# Patient Record
Sex: FEMALE | Race: WHITE | NOT HISPANIC OR LATINO | Employment: OTHER | URBAN - METROPOLITAN AREA
[De-identification: names, ages, dates, MRNs, and addresses within clinical notes are randomized per-mention and may not be internally consistent; named-entity substitution may affect disease eponyms.]

---

## 2017-03-09 ENCOUNTER — ALLSCRIPTS OFFICE VISIT (OUTPATIENT)
Dept: OTHER | Facility: OTHER | Age: 77
End: 2017-03-09

## 2017-07-13 ENCOUNTER — ALLSCRIPTS OFFICE VISIT (OUTPATIENT)
Dept: OTHER | Facility: OTHER | Age: 77
End: 2017-07-13

## 2017-07-13 DIAGNOSIS — G20 PARKINSON'S DISEASE (HCC): ICD-10-CM

## 2018-01-13 VITALS
BODY MASS INDEX: 26.68 KG/M2 | DIASTOLIC BLOOD PRESSURE: 62 MMHG | SYSTOLIC BLOOD PRESSURE: 108 MMHG | WEIGHT: 145 LBS | HEIGHT: 62 IN | OXYGEN SATURATION: 95 % | RESPIRATION RATE: 12 BRPM | HEART RATE: 83 BPM

## 2018-01-18 NOTE — PROGRESS NOTES
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)    Plan  Parkinson's disease, S/P deep brain stimulator placement    · Follow-up visit in 4 Months Evaluation and Treatment  Follow-up 4-5 months DBS 1   Status: Hold For - Scheduling  Requested for: [id number]   Ordered; For: Parkinson's disease, S/P deep brain stimulator placement; Ordered By: Yari Pedraza Performed:  Due: [id number]    Discussion/Summary  Discussion Summary:   Parkinsonian symptoms are fairly well controlled and stable on Sinemet q 3 5 hours 5 times daily  Wearing off sometimes due to delayed dosing  No changes will be made to medications or stimulation  Will check stimulator battery on follow up  Mild transient right hand paresthesia  likely positional due to leaning on elbow as this resolves in seconds  Patient's Capacity to Self-Care: Patient is unable to Self-Care:   Counseling Documentation With Imm: The patient, patient's family was counseled regarding impressions  Chief Complaint  Chief Complaint Free Text Note Form: Patient presents for a follow-up for DBS  History of Present Illness  HPI: Ms Jaida Ngo is a 68year old female with Parkinson's disease for over 10 years s/p bilateral STN DBS in May 2012 at [place], last IPG replacement 2/4/16, who presents for follow up  To review, symptoms began around 2005 with leg shaking  Initially diagnosed with RLS  A few months later, she was diagnosed with PD  When initially seen she was on Sinemet 25/100 2 tabs qid (6am then q 4 hours while awake, 4 times daily) and Comtan every other day  Comtan later stopped as was not covered  She remains on Sinemet 1 5tabs 5 times a day q 3-3/5 hours  She has some return of tremor when she is wearing off about 30 minutes prior to the next dose   They are sometimes late with the dose so she goes looking of the aides  She occasionally has right hand numbness  This only last a few seconds  She is walked at [facility] almost daily  She scoots herself around in her chair  She no longer has dyskinesia  She is able to dress on her own, but she is assisted showering  She toilets on her own using bars  She is eating well and there is no dysphagia             Review of Systems  Neurological ROS:   Constitutional: recent weight gain  HEENT: feeling congested and hoarseness  Cardiovascular:  no chest pain or pressure, no palpitations present, the heart rate was not rapid or irregular, no swelling in the arms or legs, no poor circulation  Respiratory:  no unusual or persistant cough, no shortness of breath with or without exertion  Gastrointestinal:  no nausea, no vomiting, no diarrhea, no abdominal pain, no changes in bowel habits, no melena, no loss of bowel control  Genitourinary:  no incontinence, no feelings of urinary urgency, no increase in frequency, no urinary hesitancy, no dysuria, no hematuria  Musculoskeletal: immobility or loss of function  Integumentary  no masses, no rash, no skin lesions, no livedo reticularis  Psychiatric: anxiety and depression  Endocrine  no unusual weight loss or gain, no excessive urination, no excessive thirst, no hair loss or gain, no hot or cold intolerance, no menstrual period change or irregularity, no loss of sexual ability or drive, no erection difficulty, no nipple discharge  Hematologic/Lymphatic:  no unusual bleeding, no tendency for easy bruising, no clotting skin or lumps  Neurological General:  no headache, no nausea or vomiting, no lightheadedness, no convulsions, no blackouts, no syncope, no trauma, no photopsia, no increased sleepiness, no trouble falling asleep, no snoring, no awakening at night  Neurological Mental Status: memory problems     Neurological Cranial Nerves:  no blurry or double vision, no loss of vision, no face drooping, no facial numbness or weakness, no taste or smell loss/changes, no hearing loss or ringing, no vertigo or dizziness, no dysphagia, no slurred speech  Neurological Motor findings include:  no tremor, no twitching, no cramping(pre/post exercise), no atrophy  Neurological Coordination: balance difficulties  Neurological Sensory:  no numbness, no pain, no tingling, does not fall when eyes closed or taking a shower  Neurological Gait: difficulty walking  ROS Reviewed:   ROS reviewed  Active Problems    1  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,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) (F06 8,G20)   2  Constipation (564 00) (K59 00)   3  Depression with anxiety (300 4) (F41 8)   4  Encounter for postoperative care (V58 49) (Z48 89)   5  Hip pain (719 45) (M25 559)   6  Hypertension (401 9) (I10)   7  Malignant neoplasm of breast, unspecified laterality   8  Memory Lapses Or Loss (780 93)   9  Muscle Cramps (729 82)   10  Parkinson's disease (332 0) (G20)   11  S/P deep brain stimulator placement (V45 89) (Z96 89)   12  Sciatica, right   13  Shoulder pain, right (719 41) (M25 511)   14  Thyroid disorder (246 9) (E07 9)   15  Uterine cancer (179) (C55)    Past Medical History    1  Malignant neoplasm of breast, unspecified laterality  Active Problems And Past Medical History Reviewed: The active problems and past medical history were reviewed and updated today  Surgical History    1  History of Breast Surgery Lumpectomy   2  History of Cataract Surgery   3  History of Cholecystectomy   4  History of Hernia Repair   5  History of Implantation Of Cortical Neurostimulator   6  History of Placement Of Intracranial Neurostimulator Pulse Generator   7  History of Thyroid Surgery   8  History of Total Abdominal Hysterectomy  Surgical History Reviewed: The surgical history was reviewed and updated today  Family History  Mother    1  Family history of Diabetes Mellitus (V18 0)   2   Family history of Family Health Status Of Mother -    3  Family history of Stroke Syndrome (V17 1)  Father    4  Family history of Cancer   5  Family history of Family Health Status Of Father -   Sister    10  Family history of Family Health Status Sister 1   7  Family history of Stroke Syndrome (V17 1)  Brother    8  Family history of Cancer   9  Family history of Family Health Status Brother 1    Social History    · Alcohol Use (History)   · Caffeine Use   · Educational Level - Grade 12 Completed   · Living Independently Alone (V60 3)   · Marital History -    · Never A Smoker   · Occupation: Retired  Social History Reviewed: The social history was reviewed and updated today  Current Meds   1  Acetaminophen 325 MG Oral Tablet; TAKE 1 TO 2 TABLETS EVERY 4 HOURS AS   NEEDED; Therapy: (Recorded:2016) to Recorded   2  Artificial Tears 0 4 % SOLN; INSTILL 1-2 DROPS INTO THE AFFECTED EYE(S) EVERY 6   HOURS; Therapy: (Recorded:2016) to Recorded   3  Aspirin 81 MG TABS; TAKE 1 TABLET DAILY; Therapy: (Recorded:2016) to Recorded   4  Atenolol 25 MG Oral Tablet; take 0 5 tablet daily; Therapy: (Recorded:2016) to Recorded   5  Biofreeze GEL; Apply twice a day to right shoulder and arm; Therapy: (Recorded:2016) to Recorded   6  Carbidopa-Levodopa  MG Oral Tablet; TAKE 1 AND 1/2 TABLETS 5 TIMES DAILY   (EVERY 3 HRS); Therapy: (Recorded:2017) to Recorded   7  Cetirizine HCl - 1 MG/ML Oral Syrup; 5ml once daily; Therapy: (Recorded:2016) to Recorded   8  ClonazePAM 1 MG Oral Tablet; TAKE 0 5 TABLET 3 times daily and 0 5 bid prn; Last   Rx:[id number] Ordered   9  Clotrimazole 1 % External Cream; cleanse under left breast bid prn; Therapy: (Recorded:2016) to Recorded   10  Colace 100 MG Oral Capsule; TAKE 2 CAPSULE Daily; Therapy: (Recorded:2016) to Recorded   11  Guaifenesin-Codeine 100-10 MG/5ML LIQD; PRN; Therapy: (Recorded:2016) to Recorded   12   Levothyroxine Sodium 50 MCG Oral Tablet; TAKE 1 TABLET DAILY AS DIRECTED; Therapy: (Recorded:03Nov2016) to Recorded   13  Senna Plus TABS; TAKE 1 TABLET DAILY AS NEEDED; Therapy: (Recorded:03Nov2016) to Recorded   14  Sertraline HCl - 100 MG Oral Tablet; Take 1 tablet daily; Therapy: (Recorded:03Nov2016) to Recorded   15  Vitamin D3 1000 UNIT Oral Tablet; TAKE 1 TABLET DAILY; Therapy: (Recorded:03Nov2016) to Recorded  Medication List Reviewed: The medication list was reviewed and updated today  Allergies    1  Codeine Derivatives   2  Latex Gloves MISC   3  Morphine Derivatives   4  Sulfa Drugs    Vitals  Signs   Recorded: 20XKN1492 10:36AM   Heart Rate: 72  Respiration: 12  Systolic: 610  Diastolic: 68  Height: 5 ft 2 in  Weight: 145 lb   BMI Calculated: 26 52  BSA Calculated: 1 67    Physical Exam    Constitutional   General appearance: No acute distress, well appearing and well nourished  Musculoskeletal   Gait and station: Abnormal   (Not ambulated given significant postural stability  Walks with gait belt with PT  )   Muscle strength: Normal strength throughout  Muscle tone: No atrophy, abnormal movements, flaccidity, cogwheeling or spasticity  Involuntary movements: Abnormal involuntary movements were observed  (No action tremor  No rest tremor  Mild bradykinesia on finger taps 2,1, handgrips 2,1, TEMI 2,1 and heel taps 1,1 toe taps 2,2  Mild neck dyskinesia with activation  No dystonia)   Neurologic   Orientation to person, place, and time: Normal     Recent and remote memory: Demonstrates normal memory  Attention span and concentration: Normal thought process and attention span  Language: Names objects, able to repeat phrases and speaks spontaneously      2nd cranial nerve: Normal     3rd, 4th, and 6th cranial nerves: Normal     5th cranial nerve: Normal     7th cranial nerve: Normal     8th cranial nerve: Normal     9th cranial nerve: Normal     11th cranial nerve: Normal     12th cranial nerve: Normal        Signatures   Electronically signed by : Gian Cosby MD; Mar  9 2017 10:54AM EST                       (Author)

## 2018-01-22 VITALS
RESPIRATION RATE: 12 BRPM | HEART RATE: 72 BPM | WEIGHT: 145 LBS | SYSTOLIC BLOOD PRESSURE: 110 MMHG | DIASTOLIC BLOOD PRESSURE: 68 MMHG | BODY MASS INDEX: 26.68 KG/M2 | HEIGHT: 62 IN

## 2018-02-15 ENCOUNTER — PROCEDURE VISIT (OUTPATIENT)
Dept: NEUROLOGY | Facility: CLINIC | Age: 78
End: 2018-02-15
Payer: COMMERCIAL

## 2018-02-15 VITALS
HEART RATE: 82 BPM | WEIGHT: 145 LBS | SYSTOLIC BLOOD PRESSURE: 111 MMHG | DIASTOLIC BLOOD PRESSURE: 55 MMHG | HEIGHT: 62 IN | BODY MASS INDEX: 26.68 KG/M2

## 2018-02-15 DIAGNOSIS — Z96.89 S/P DEEP BRAIN STIMULATOR PLACEMENT: ICD-10-CM

## 2018-02-15 DIAGNOSIS — G20 PARKINSON'S DISEASE (HCC): Primary | ICD-10-CM

## 2018-02-15 PROBLEM — R48.2: Status: ACTIVE | Noted: 2018-02-15

## 2018-02-15 PROCEDURE — 95970 ALYS NPGT W/O PRGRMG: CPT | Performed by: PSYCHIATRY & NEUROLOGY

## 2018-02-15 PROCEDURE — 3725F SCREEN DEPRESSION PERFORMED: CPT | Performed by: PSYCHIATRY & NEUROLOGY

## 2018-02-15 PROCEDURE — 99214 OFFICE O/P EST MOD 30 MIN: CPT | Performed by: PSYCHIATRY & NEUROLOGY

## 2018-02-15 RX ORDER — ACETAMINOPHEN 160 MG/5ML
1-2 SOLUTION ORAL EVERY 4 HOURS PRN
COMMUNITY
End: 2019-03-11

## 2018-02-15 RX ORDER — CLONAZEPAM 0.5 MG/1
TABLET ORAL 3 TIMES DAILY
COMMUNITY
Start: 2018-02-06

## 2018-02-15 RX ORDER — ASPIRIN 81 MG/1
81 TABLET, CHEWABLE ORAL
Status: ON HOLD | COMMUNITY
Start: 2013-04-09 | End: 2019-03-15 | Stop reason: ALTCHOICE

## 2018-02-15 NOTE — ASSESSMENT & PLAN NOTE
Overall parkinsonian symptoms are stable  She has prominent postural instability but functioning well just using wheelchair to propel and with self transfers  Will continue on Sinemet with no changes  Deep brain stimulator interrogated but no changes made  Battery 2 83  See scanned stimulation sheets for details

## 2018-02-15 NOTE — ASSESSMENT & PLAN NOTE
Intermittent and appears to be worse today but daughter reports overall she felt it was less frequent  This is likely related to progression of Pd  Once again discussed option of neurotoxin injections to obicularis oculi but she feels it is not bothersome enough to do

## 2018-02-15 NOTE — PROGRESS NOTES
Patient ID: Zehra Grossman is a 68 y o  female  Assessment/Plan:    Parkinson's disease ([facility] [address] )  Overall parkinsonian symptoms are stable  She has prominent postural instability but functioning well just using wheelchair to propel and with self transfers  Will continue on Sinemet with no changes  Deep brain stimulator interrogated but no changes made  Battery 2 83  See scanned stimulation sheets for details  Apraxia of eyelid  Intermittent and appears to be worse today but daughter reports overall she felt it was less frequent  This is likely related to progression of Pd  Once again discussed option of neurotoxin injections to obicularis oculi but she feels it is not bothersome enough to do  Diagnoses and all orders for this visit:    Parkinson's disease ([facility] [address] )    S/P deep brain stimulator placement    Other orders  -     Acetaminophen 325 MG/10 15ML SOLN; Take 1-2 tablets by mouth every 4 (four) hours as needed  -     hypromellose (NATURES TEARS) 0 4 % SOLN; Apply to eye  -     aspirin 81 mg chewable tablet; Chew 81 mg  -     Cetirizine HCl 5 MG/5ML SYRP; Take 5 mg by mouth  -     Cholecalciferol 1000 units CHEW; Chew 1,000 Units  -     clonazePAM (KlonoPIN) 0 5 mg tablet;            Subjective:    Ms Jerry Peter is a pleasant NH resident with Parkinson's disease for over 10 years s/p bilateral STN DBS in May 2012 at [facility], last IPG replacement 2/4/16, who presents for follow up  To review, symptoms began around 2005 with leg shaking  Initially diagnosed with RLS  A few months later, she was diagnosed with PD  When initially seen she was on Sinemet 25/100 2 tabs qid (6am then q 4 hours while awake, 4 times daily) and Comtan every other day  Comtan later stopped as was not covered  She remains on Sinemet 1 5tabs 5 times a day q 3-3/5 hours    She is assisted in the NH with showering  She dresses independently but mild slowness with shoes  She denies any dysphagia but meat is ground   No issues with liquids  She does however take her medications in apple sauce at times  She sleeps well  She is on gabapentin for back pain  She walks with walker with two assists and spends most of the day scooting herself in the wheelchair  She has two falls during transfer but injuries  She can transfer to toilet and bed on her own  She no longer has much dyskinesia  Mostly present in her face with eye closing  This seems a little better to her daughter but worse today  The following portions of the patient's history were reviewed and updated as appropriate: allergies, past family history, past social history and past surgical history  Objective:    Blood pressure 111/55, pulse 82, height 5' 2" (1 575 m), weight 65 8 kg (145 lb)  Physical Exam   Eyes: EOM are normal  Pupils are equal, round, and reactive to light  Neurological: She has normal strength  Gait normal    Psychiatric: Her speech is normal        Neurological Exam    Mental Status  The patient is alert and oriented to person, place, time, and situation  She has no visuospatial neglect  Her speech is normal  Her language is fluent with no aphasia  She has normal attention span and concentration  She has a normal fund of knowledge  Cranial Nerves  CN I: The patient has not tested  CN II: The patient's visual acuity and visual fields are normal   CN III, IV, VI: The patient's pupils are equally round and reactive to light and ocular movements are normal   CN V: The patient has normal facial sensation  CN VII:  The patient has symmetric facial movement  CN VIII:  The patient's hearing is normal   CN IX, X: The patient has symmetric palate movement and normal gag reflex  CN XI: The patient's shoulder shrug strength is normal   CN XII: The patient's tongue is midline without atrophy or fasciculations  Motor   Her overall muscle tone is normal throughout  Her strength is 5/5 throughout all four extremities  No action tremor  No rest tremor  No rigidity  Moderate bradykinesia on finger taps 2,0, handgrips 2,0, TEMI 2,1 and heel taps 2,2  Eye opening apraxia  No dystonia  Sensory  The patient's sensation is to light touch  Reflexes  She has glabellar tap release signs present  Gait and Coordination  The patient has normal gait and station  ROS:    Review of Systems   Constitutional: Positive for fatigue  HENT: Positive for congestion  Eyes: Negative  Respiratory: Negative  Cardiovascular: Negative  Gastrointestinal: Positive for constipation  Endocrine: Negative  Genitourinary: Negative  Musculoskeletal: Negative  Skin: Negative  Allergic/Immunologic: Negative  Neurological: Positive for tremors (Little bit)  Hematological: Negative  Psychiatric/Behavioral: Negative

## 2018-08-09 ENCOUNTER — PROCEDURE VISIT (OUTPATIENT)
Dept: NEUROLOGY | Facility: CLINIC | Age: 78
End: 2018-08-09
Payer: COMMERCIAL

## 2018-08-09 VITALS — DIASTOLIC BLOOD PRESSURE: 64 MMHG | HEART RATE: 84 BPM | SYSTOLIC BLOOD PRESSURE: 134 MMHG

## 2018-08-09 DIAGNOSIS — R48.2 APRAXIA OF EYELID: Primary | ICD-10-CM

## 2018-08-09 DIAGNOSIS — G20 PARKINSON'S DISEASE (HCC): ICD-10-CM

## 2018-08-09 PROCEDURE — 95970 ALYS NPGT W/O PRGRMG: CPT | Performed by: PSYCHIATRY & NEUROLOGY

## 2018-08-09 PROCEDURE — 99214 OFFICE O/P EST MOD 30 MIN: CPT | Performed by: PSYCHIATRY & NEUROLOGY

## 2018-08-09 RX ORDER — ACETAMINOPHEN 500 MG
350 TABLET ORAL EVERY 4 HOURS PRN
COMMUNITY

## 2018-08-09 RX ORDER — LEVOTHYROXINE SODIUM 0.05 MG/1
1 TABLET ORAL DAILY
COMMUNITY

## 2018-08-09 RX ORDER — ONDANSETRON 4 MG/1
4 TABLET, FILM COATED ORAL EVERY 8 HOURS
COMMUNITY
End: 2019-03-12

## 2018-08-09 RX ORDER — CONTAINER,EMPTY
BOTTLE MISCELLANEOUS AS NEEDED
COMMUNITY
End: 2019-03-12

## 2018-08-09 RX ORDER — OMEPRAZOLE 20 MG/1
20 CAPSULE, DELAYED RELEASE ORAL
COMMUNITY

## 2018-08-09 RX ORDER — FLUTICASONE PROPIONATE 50 MCG
1 SPRAY, SUSPENSION (ML) NASAL DAILY
COMMUNITY
Start: 2018-08-01

## 2018-08-09 RX ORDER — GABAPENTIN 300 MG/1
CAPSULE ORAL DAILY
COMMUNITY
Start: 2018-08-06

## 2018-08-09 NOTE — ASSESSMENT & PLAN NOTE
Parkinsonian symptoms are stable with generalized bradykinesia and postural instability  She is doing well at the NH and functions quite independently given difficulties  Deep brain stimulator interrogated but no changes made  Battery 2 8  See attached forms for settings

## 2018-08-09 NOTE — PROGRESS NOTES
Patient ID: Suraj Rivas is a 66 y o  female  Assessment/Plan:    Parkinson's disease ([facility] [address] )  Parkinsonian symptoms are stable with generalized bradykinesia and postural instability  She is doing well at the NH and functions quite independently given difficulties  Deep brain stimulator interrogated but no changes made  Battery 2 8  See attached forms for settings  Diagnoses and all orders for this visit:    Apraxia of eyelid    Parkinson's disease ([facility] [address] )    Other orders  -     ondansetron (ZOFRAN) 4 mg tablet; Take 4 mg by mouth every 8 (eight) hours  -     omeprazole (PRILOSEC) 20 mg delayed release capsule; Take 20 mg by mouth  -     levothyroxine 50 mcg tablet; Take 1 tablet by mouth daily  -     gabapentin (NEURONTIN) 300 mg capsule;   -     fluticasone (FLONASE) 50 mcg/act nasal spray;   -     acetaminophen (TYLENOL) 500 mg tablet; Take 500 mg by mouth every 4 (four) hours as needed for mild pain  -     benzocaine (ORAJEL) 10 % mucosal gel; Apply to the mouth or throat as needed for mucositis  -     bisacodyl (DULCOLAX) 5 mg EC tablet; Take 10 mg by mouth daily as needed for constipation  -     [employer]s (ENEMA BOTTLE) MISC; by Does not apply route as needed  -     dimethicone (REMEDY NUTRASHIELD) 1 % cream; Apply topically daily as needed for dry skin           Subjective:    Ms Sergey Perry is a pleasant NH resident with Parkinson's disease for over 10 years s/p bilateral STN DBS in May 2012 at [facility], last IPG replacement 2/4/16, who presents for follow up  To review, symptoms began around 2005 with leg shaking  Initially diagnosed with RLS  A few months later, she was diagnosed with PD  When initially seen she was on Sinemet 25/100 2 tabs qid (6am then q 4 hours while awake, 4 times daily) and Comtan every other day  Comtan later stopped as was not covered  She remains on Sinemet 1 5tabs 5 times a day q 3-3 5 hours  She is assisted in the NH with showering  Speech is soft   She underwent speech therapy and has the exercises but does not always do them  Her daughter notices her eyes are squinty at times  She dresses independently but mild slowness with shoes  She sometimes asks for help  She denies any dysphagia but meat is ground  No issues with liquids  She sleeps well  She is on gabapentin for back pain  She walks with walker with two assists and spends most of the day scooting herself in the wheelchair  She toilets independently  She can transfer to toilet and bed on her own  She no longer has dyskinesia  The following portions of the patient's history were reviewed and updated as appropriate: allergies, current medications and problem list            Objective:    Blood pressure 134/64, pulse 84  Physical Exam   Constitutional: She appears well-developed  Eyes: EOM are normal  Pupils are equal, round, and reactive to light  Neurological: She has normal strength and normal reflexes  Gait normal    Psychiatric: Her speech is normal    Vitals reviewed  Neurological Exam    Mental Status  The patient is alert and oriented to person, place, time, and situation  Her recent and remote memory are normal  Her speech is normal  Her language is fluent with no aphasia  She has normal attention span and concentration  She follows multi-step commands  She has a normal fund of knowledge  Cranial Nerves  CN I: The patient has not tested  CN II: The patient's visual acuity and visual fields are normal   CN III, IV, VI: The patient's pupils are equally round and reactive to light and ocular movements are normal   CN V: The patient has normal facial sensation  CN VII:  The patient has symmetric facial movement  CN VIII:  The patient's hearing is normal   CN IX, X: The patient has symmetric palate movement  CN XI: The patient's shoulder shrug strength is normal   CN XII: The patient's tongue is midline without atrophy or fasciculations      Motor   Her overall muscle tone is normal throughout  Her strength is 5/5 throughout all four extremities  UPDRS   Moderate Hypomimia 2  Moderate hypophonia 2  No rest tremor RUE, LUE, RLE, LLE  No facial, lip, or chin tremor  No action tremors of RUE, LUE  Rigidity present in RUE, LUE, RLE, LUE and neck  Moderate bradykinesia on finger taps 2,2, handgrips 1,1, TEMI 2,1, heel taps 0,0, (toe taps 1,0)  Posture stooped 2  Not ambulated today, no walker  Mild but abnormal body bradykinesia 2  Sensory  The patient's sensation is to light touch  Reflexes  Deep tendon reflexes are 2+ and symmetric in all four extremities with downgoing toes bilaterally  Gait and Coordination  The patient has normal gait and station  ROS:    Review of Systems   Constitutional: Negative  Negative for appetite change and fever  HENT: Positive for voice change  Negative for hearing loss, tinnitus and trouble swallowing  Eyes: Negative  Negative for photophobia and pain  Respiratory: Negative  Negative for shortness of breath  Cardiovascular: Negative  Negative for palpitations  Gastrointestinal: Negative  Negative for nausea and vomiting  Endocrine: Negative  Negative for cold intolerance and heat intolerance  Genitourinary: Negative  Negative for dysuria, frequency and urgency  Musculoskeletal: Positive for gait problem  Negative for myalgias and neck pain  Skin: Negative  Negative for rash  Neurological: Positive for tremors and speech difficulty  Negative for dizziness, seizures, syncope, facial asymmetry, weakness, light-headedness, numbness and headaches  Memory problem   Hematological: Negative  Does not bruise/bleed easily  Psychiatric/Behavioral: Negative for confusion, hallucinations and sleep disturbance  The patient is nervous/anxious

## 2019-01-21 LAB — HBA1C MFR BLD HPLC: 9 %

## 2019-01-23 ENCOUNTER — TRANSCRIBE ORDERS (OUTPATIENT)
Dept: ADMINISTRATIVE | Facility: HOSPITAL | Age: 79
End: 2019-01-23

## 2019-01-23 ENCOUNTER — TELEPHONE (OUTPATIENT)
Dept: NEUROLOGY | Facility: CLINIC | Age: 79
End: 2019-01-23

## 2019-01-23 DIAGNOSIS — R42 DIZZINESS AND GIDDINESS: Primary | ICD-10-CM

## 2019-01-23 NOTE — TELEPHONE ENCOUNTER
Jerri @[facility] called and states that pt has been c/o dizziness started 1 week ago  "Vital signs are good"  Dizziness comes and goes, navdeep in the morning and at night  Denies dizziness from sitting to standing  No new meds   Dr Sean Glaser is starting pt on meclizine  Dr Sean Glaser is ordering head CT scan  Pt has dbs  Asking if ok to do head Ct scan? Any precaution? Any recommendation?            [phone]

## 2019-01-24 NOTE — TELEPHONE ENCOUNTER
Jerri @[facility] [phone]    Please clarify re DBS w/CT, does it need to be turned off?  They do not want to turn off if not needed  Below note states it should not interfere w/CT but should turn it off    Also, Jerri asking if there is any recommendation re dizziness    Please advise

## 2019-01-24 NOTE — TELEPHONE ENCOUNTER
Dizziness form sitting to standing sound like orthostatic hypotension but could also be benign paroxsymal position vertigo  Meclizine would help BPPV  Did they do orthostatic blood pressures? Increase hydration   I will further evaluate at her next visit 2/14

## 2019-01-26 ENCOUNTER — HOSPITAL ENCOUNTER (OUTPATIENT)
Dept: CT IMAGING | Facility: HOSPITAL | Age: 79
Discharge: HOME | End: 2019-01-26
Payer: COMMERCIAL

## 2019-01-26 DIAGNOSIS — R42 DIZZINESS AND GIDDINESS: ICD-10-CM

## 2019-01-26 PROCEDURE — 70450 CT HEAD/BRAIN W/O DYE: CPT

## 2019-02-14 ENCOUNTER — TELEPHONE (OUTPATIENT)
Dept: NEUROLOGY | Facility: CLINIC | Age: 79
End: 2019-02-14

## 2019-02-14 ENCOUNTER — PROCEDURE VISIT (OUTPATIENT)
Dept: NEUROLOGY | Facility: CLINIC | Age: 79
End: 2019-02-14
Payer: COMMERCIAL

## 2019-02-14 VITALS — SYSTOLIC BLOOD PRESSURE: 120 MMHG | DIASTOLIC BLOOD PRESSURE: 75 MMHG

## 2019-02-14 DIAGNOSIS — G20 PARKINSON'S DISEASE (HCC): Primary | ICD-10-CM

## 2019-02-14 PROCEDURE — 95970 ALYS NPGT W/O PRGRMG: CPT | Performed by: PSYCHIATRY & NEUROLOGY

## 2019-02-14 PROCEDURE — 99213 OFFICE O/P EST LOW 20 MIN: CPT | Performed by: PSYCHIATRY & NEUROLOGY

## 2019-02-14 RX ORDER — MECLIZINE HCL 12.5 MG/1
12.5 TABLET ORAL 3 TIMES DAILY PRN
COMMUNITY

## 2019-02-14 RX ORDER — POLYETHYLENE GLYCOL 3350 17 G/17G
17 POWDER, FOR SOLUTION ORAL DAILY
COMMUNITY

## 2019-02-14 RX ORDER — MINERAL OIL 100 G/100G
1 OIL RECTAL ONCE
COMMUNITY

## 2019-02-14 NOTE — TELEPHONE ENCOUNTER
Pt seen today by Dr Ceci Martinez  Left IPG at 2 59  Please schedule pt for IPG replacement  Once pt scheduled please forward in-basket message back to me  Thank you

## 2019-02-14 NOTE — PROGRESS NOTES
Patient ID: Aquiles Thorpe is a 66 y o  female  Assessment/Plan:    Parkinson's disease with use of electrical brain stimulation (HCC)  Parkinsonian symptoms are stable with generalized bradykinesia and postural instability  Tremors are well controlled  She is doing well mobilizing with walker at the NH  Deep brain stimulator interrogated  IPG is in need of replacement, 2 59  Will refer to neurosurgery  See attached forms for settings  Diagnoses and all orders for this visit:    Parkinson's disease [facility])  -     Ambulatory referral to Neurosurgery; Future    Other orders  -     mineral oil enema; Insert 1 enema into the rectum once  -     polyethylene glycol (MIRALAX) 17 g packet; Take 17 g by mouth daily  -     Menthol, Topical Analgesic, (BIOFREEZE) 4 % GEL; Apply topically  -     insulin aspart (NovoLOG) 100 Units/mL injection pen; Inject 5 Units under the skin Three times daily as needed  -     insulin glargine (LANTUS SOLOSTAR) 100 units/mL injection pen; Inject 5 Units under the skin  -     meclizine (ANTIVERT) 12 5 MG tablet; Take 12 5 mg by mouth Three times daily as needed  -     benzocaine-menthol (CEPACOL) 15-3 6 mg per lozenge; 1 lozenge by Transmucosal route every 2 (two) hours as needed           Subjective:    Ms Clarke Nguyen is a pleasant NH resident with Parkinson's disease for over 10 years s/p bilateral STN DBS in May 2012 at [facility], last IPG replacement 2/4/16, who presents for follow up  To review, symptoms began around 2005 with leg shaking  Initially diagnosed with RLS  A few months later, she was diagnosed with PD  When initially seen she was on Sinemet 25/100 2 tabs qid (6am then q 4 hours while awake, 4 times daily) and Comtan every other day  Comtan later stopped as was not covered  She remains on Sinemet 1 5tabs 5 times a day q 3-3 5 hours  She denies any wearing off  She has a tremor in the right hand on occasion  She is assisted in the NH with showering   She is able to dress independently but slowly  Speech is soft  She does not do exercises  Her eyes are not as droopy  She denies any dysphagia but meat is ground  No issues with liquids  She sleeps well  She is on gabapentin for back pain  She walks with walker with two assists at times but spends most of the day scooting herself in the wheelchair  She toilets independently  She had two slips but no injuries  She no longer has dyskinesia  Dizzines resolved after being diagnosed with diabetes and being put on treatment  Ct head was unremarkable  The following portions of the patient's history were reviewed and updated as appropriate: allergies, current medications, past family history, past medical history, past social history and past surgical history  Objective:    Blood pressure 120/75  Physical Exam   Constitutional: She appears well-developed  Eyes: Pupils are equal, round, and reactive to light  Neurological: She is alert  She has normal strength  Psychiatric: Her speech is normal    Vitals reviewed  Neurological Exam  Mental Status  Awake and alert  Oriented only to person, place and situation  Recent and remote memory are intact  Speech is normal  Language is fluent with no aphasia  Attention and concentration are normal     Cranial Nerves  CN III, IV, VI: Extraocular movements intact bilaterally  Pupils equal round and reactive to light bilaterally  CN V: Facial sensation is normal   CN VII: Full and symmetric facial movement  CN VIII: Hearing is normal   CN IX, X: Palate elevates symmetrically  CN XI: Shoulder shrug strength is normal   CN XII: Tongue midline without atrophy or fasciculations  Motor   Normal muscle tone  Strength is 5/5 throughout all four extremities  Sensory  Light touch is normal in upper and lower extremities  Reflexes  Glabellar tap present      Coordination  Right: Finger-to-nose normal  Rapid alternating movement abnormality:  Left: Finger-to-nose normal  Rapid alternating movement abnormality:    Gait  Not ambulated        MDS UPDRS III                                  Time since last dose:     Speech  2    Facial Expression  1    Rigidity - Neck  0    Rigidity - Upper Extremity (Right)  1    Rigidity - Upper Extremity (Left)   0    Rigidity - Lower Extremity (Right)  1    Rigidity - Lower Extremity (Left)   0    Finger Taps (Right)   2    Finger Taps (Left)   1    Hand Movement (Right)  0    Hand Movement (Left)   0    Pronation/Supination (Right)  2    Pronation/Supination (Left)   1    Toe Tapping (Right) 1    Toe Tapping (Left) 1    Leg Agility (Right)  2    Leg Agility (Left)   2    Arising from Chair       Gait       Freezing of Gait     Postural Stability       Posture 1    Global spontaneity of movement 2    Postural Tremor (Right) 0    Postural Tremor (Left) 0    Kinetic Tremor (Right)  0    Kinetic Tremor (Left)  0    Rest tremor amplitude RUE 0    Rest tremor amplitude LUE 0    Rest tremor amplitude RLE 0    Reset tremor amplitude LLE 0    Lip/Jaw Tremor  0    Consistency of tremor 0    Motor Exam Total:        Mild right sided dyskinesia, with leg and arm extension only when activated  ROS:    Review of Systems   Constitutional: Positive for appetite change  HENT: Positive for voice change  Eyes: Positive for visual disturbance  Eyelid issues     Respiratory: Negative  Cardiovascular: Negative  Gastrointestinal: Negative  Endocrine: Negative  Genitourinary: Negative  Musculoskeletal: Negative  Skin: Negative  Allergic/Immunologic: Negative  Neurological: Positive for dizziness (has increased recently) and tremors (once in a while right arm shakes)  Hematological: Negative  Psychiatric/Behavioral: The patient is nervous/anxious (sometimes)  All other systems reviewed and are negative  Review of system reviewed and agree with above

## 2019-02-14 NOTE — TELEPHONE ENCOUNTER
Enid Tariq will be calling patient as she will be a new patient/consult for us as of Monday  I am holding 3/19/19 for the surgery date in [address]  Will confirm when we get ahold of patient  Thanks!   Tiff

## 2019-02-14 NOTE — ASSESSMENT & PLAN NOTE
Parkinsonian symptoms are stable with generalized bradykinesia and postural instability  Tremors are well controlled  She is doing well mobilizing with walker at the NH  Deep brain stimulator interrogated  IPG is in need of replacement, 2 59  Will refer to neurosurgery  See attached forms for settings

## 2019-02-15 NOTE — TELEPHONE ENCOUNTER
Patient is schedule for consult with Dr Martir Jose on 3/7/19  Date of 3/19/19 for her surgery will stand  Thanks!   Tiff

## 2019-02-18 NOTE — TELEPHONE ENCOUNTER
Called patients daughter LMOM if patients daughter calls back patient is scheduled for 4/18 at 10:30 in the [facility] location with Dr Rosendo Alexander please confirm appointment   Thank You

## 2019-03-04 ENCOUNTER — TELEPHONE (OUTPATIENT)
Dept: NEUROLOGY | Facility: CLINIC | Age: 79
End: 2019-03-04

## 2019-03-07 ENCOUNTER — OFFICE VISIT (OUTPATIENT)
Dept: NEUROSURGERY | Facility: CLINIC | Age: 79
End: 2019-03-07
Payer: COMMERCIAL

## 2019-03-07 VITALS
HEIGHT: 62 IN | TEMPERATURE: 97.7 F | BODY MASS INDEX: 27.23 KG/M2 | DIASTOLIC BLOOD PRESSURE: 67 MMHG | WEIGHT: 148 LBS | RESPIRATION RATE: 16 BRPM | HEART RATE: 78 BPM | SYSTOLIC BLOOD PRESSURE: 112 MMHG

## 2019-03-07 DIAGNOSIS — G20 PARKINSON'S DISEASE (HCC): ICD-10-CM

## 2019-03-07 PROCEDURE — 99214 OFFICE O/P EST MOD 30 MIN: CPT | Performed by: NEUROLOGICAL SURGERY

## 2019-03-07 RX ORDER — CHLORHEXIDINE GLUCONATE 0.12 MG/ML
15 RINSE ORAL ONCE
Status: CANCELLED | OUTPATIENT
Start: 2019-03-07 | End: 2019-03-07

## 2019-03-07 NOTE — PROGRESS NOTES
Assessment/Plan:    No problem-specific Assessment & Plan notes found for this encounter  Patient with bilateral STN DBS medtronic with end of battery life  OR for left DBS generator replacement     Diagnoses and all orders for this visit:    [facility] ([facility] )  -     Ambulatory referral to Neurosurgery  -     Case request operating room: REPLACEMENT IMPLANTABLE PULSE GENERATOR (IPG) DEEP BRAIN STIMULATION (DBS), LEFT; Standing  -     Case request operating room: REPLACEMENT IMPLANTABLE PULSE GENERATOR (IPG) DEEP BRAIN STIMULATION (DBS), LEFT  -     UA w Reflex to Microscopic w Reflex to Culture  -     Comprehensive metabolic panel; Future  -     CBC and differential; Future  -     APTT; Future  -     Protime-INR; Future  -     HEMOGLOBIN A1C W/ EAG ESTIMATION; Future  -     EKG 12 lead; Future    Other orders  -     Diet NPO; Sips with meds; Standing  -     Nursing Communication [address] [place] [address] Interventions Implemented; Standing  -     Nursing Communication Use 2 CHG cloths, have the patient wash his/her body from the neck down or have staff wash entire body (from neck down) if patient is unable; Standing  -     Nursing Communication Swab both nares with Povidone-Iodine solution, EXCLUDE if patient has shellfish/Iodine allergy; Standing  -     chlorhexidine (PERIDEX) 0 12 % oral rinse 15 mL  -     Void on call to OR; Standing  -     Insert peripheral IV; Standing  -     ceFAZolin (ANCEF) 2,000 mg in sodium chloride 0 9 % 50 mL IVPB          Subjective:      Patient ID: Archie Briones is a 66 y o  female  Patient with PD and bilateral STN electrodes  She obtains good therapy from her stimulator and her last replacement was in 2016  She is nearing end of life and would like a replacement  She lives currently in a nursing facility         The following portions of the patient's history were reviewed and updated as appropriate:   She  has a past medical history of Parkinson's disease ([facility] ) and S/P deep brain stimulator placement  She   Patient Active Problem List    Diagnosis Date Noted    Parkinson's disease ([facility] ) 03/07/2019    Parkinson's disease with use of electrical brain stimulation ([facility] ) 02/15/2018    S/P deep brain stimulator placement 02/15/2018    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     She  has a past surgical history that includes Eye surgery; Hysterectomy; Total thyroidectomy; and pr imp stim,cranial,subq,1 array (Left, 2/4/2016)  Her family history is not on file  She  reports that she has never smoked  She has never used smokeless tobacco  She reports that she does not drink alcohol or use drugs  Current Outpatient Medications on File Prior to Visit   Medication Sig    acetaminophen (TYLENOL) 500 mg tablet Take 500 mg by mouth every 4 (four) hours as needed for mild pain    Acetaminophen 325 MG/10 15ML SOLN Take 1-2 tablets by mouth every 4 (four) hours as needed    aspirin 81 mg chewable tablet Chew 81 mg    atenolol (TENORMIN) 25 mg tablet Take 12 5 mg by mouth daily  Only takes 12 5 mg    benzocaine (ORAJEL) 10 % mucosal gel Apply to the mouth or throat as needed for mucositis    benzocaine-menthol (CEPACOL) 15-3 6 mg per lozenge 1 lozenge by Transmucosal route every 2 (two) hours as needed    bisacodyl (DULCOLAX) 5 mg EC tablet Take 10 mg by mouth daily as needed for constipation    carbidopa-levodopa (SINEMET)  mg per tablet Take 1 5 tablets by mouth 5 (five) times a day 2-7-18-3-6pm      Cetirizine HCl 5 MG/5ML SYRP Take 5 mg by mouth    cholecalciferol (VITAMIN D3) 1,000 units tablet Take 2,000 Units by mouth   Cholecalciferol 1000 units CHEW Chew 1,000 Units    clonazePAM (KlonoPIN) 0 5 mg tablet     dimethicone (REMEDY NUTRASHIELD) 1 % cream Apply topically daily as needed for dry skin    diphenhydrAMINE (BENADRYL) 12 5 MG chewable tablet Chew 12 5 mg 3 (three) times a day as needed for allergies      entacapone (COMTAN) 200 mg tablet Take 200 mg by mouth 2 (two) times a day  For breakthrough tremors    fluticasone (FLONASE) 50 mcg/act nasal spray     gabapentin (NEURONTIN) 300 mg capsule     hypromellose (NATURES TEARS) 0 4 % SOLN Apply to eye    insulin aspart (NovoLOG) 100 Units/mL injection pen Inject 5 Units under the skin Three times daily as needed    insulin glargine (LANTUS SOLOSTAR) 100 units/mL injection pen Inject 5 Units under the skin    levothyroxine 50 mcg tablet Take 1 tablet by mouth daily    meclizine (ANTIVERT) 12 5 MG tablet Take 12 5 mg by mouth Three times daily as needed    Menthol, Topical Analgesic, (BIOFREEZE) 4 % GEL Apply topically    mineral oil enema Insert 1 enema into the rectum once    omeprazole (PRILOSEC) 20 mg delayed release capsule Take 20 mg by mouth    ondansetron (ZOFRAN) 4 mg tablet Take 4 mg by mouth every 8 (eight) hours    polyethylene glycol (MIRALAX) 17 g packet Take 17 g by mouth daily    rivastigmine (EXELON) 1 5 mg capsule Take 1 5 mg by mouth 2 (two) times a day   Rubber Goods (ENEMA BOTTLE) MISC by Does not apply route as needed    senna-docusate sodium (SENOKOT S) 8 6-50 mg per tablet Take 1 tablet by mouth daily as needed for constipation   sertraline (ZOLOFT) 100 mg tablet Take 100 mg by mouth daily at bedtime  No current facility-administered medications on file prior to visit       Review of Systems   Constitutional: Negative  Eyes: Negative  Endocrine: Negative  Musculoskeletal: Positive for gait problem  Allergic/Immunologic: Negative  Hematological:        DM         Objective:      /67 (BP Location: Left arm)   Pulse 78   Temp 97 7 °F (36 5 °C) (Tympanic)   Resp 16   Ht 5' 2" (1 575 m)   Wt 67 1 kg (148 lb)   BMI 27 07 kg/m²          Physical Exam   Constitutional: She is oriented to person, place, and time  She appears well-developed  In wheelchair   HENT:   Head: Normocephalic  Eyes: Pupils are equal, round, and reactive to light  Cardiovascular: Normal rate  Pulmonary/Chest: Effort normal    Neurological: She is alert and oriented to person, place, and time  No cranial nerve deficit or sensory deficit

## 2019-03-07 NOTE — H&P (VIEW-ONLY)
Assessment/Plan:    No problem-specific Assessment & Plan notes found for this encounter  Patient with bilateral STN DBS medtronic with end of battery life  OR for left DBS generator replacement     Diagnoses and all orders for this visit:    Parkinson's disease ([facility] )  -     Ambulatory referral to Neurosurgery  -     Case request operating room: REPLACEMENT IMPLANTABLE PULSE GENERATOR (IPG) DEEP BRAIN STIMULATION (DBS), LEFT; Standing  -     Case request operating room: REPLACEMENT IMPLANTABLE PULSE GENERATOR (IPG) DEEP BRAIN STIMULATION (DBS), LEFT  -     UA w Reflex to Microscopic w Reflex to Culture  -     Comprehensive metabolic panel; Future  -     CBC and differential; Future  -     APTT; Future  -     Protime-INR; Future  -     HEMOGLOBIN A1C W/ EAG ESTIMATION; Future  -     EKG 12 lead; Future    Other orders  -     Diet NPO; Sips with meds; Standing  -     Nursing Communication [address] [place] [address] Interventions Implemented; Standing  -     Nursing Communication Use 2 CHG cloths, have the patient wash his/her body from the neck down or have staff wash entire body (from neck down) if patient is unable; Standing  -     Nursing Communication Swab both nares with Povidone-Iodine solution, EXCLUDE if patient has shellfish/Iodine allergy; Standing  -     chlorhexidine (PERIDEX) 0 12 % oral rinse 15 mL  -     Void on call to OR; Standing  -     Insert peripheral IV; Standing  -     ceFAZolin (ANCEF) 2,000 mg in sodium chloride 0 9 % 50 mL IVPB          Subjective:      Patient ID: Chencho Parmar is a 66 y o  female  Patient with PD and bilateral STN electrodes  She obtains good therapy from her stimulator and her last replacement was in 2016  She is nearing end of life and would like a replacement  She lives currently in a nursing facility         The following portions of the patient's history were reviewed and updated as appropriate:   She  has a [facility] and S/P deep brain stimulator placement  She   Patient Active Problem List    Diagnosis Date Noted    Parkinson's disease ([facility] ) 03/07/2019    Parkinson's disease with use of electrical brain stimulation ([facility] ) 02/15/2018    S/P deep brain stimulator placement 02/15/2018    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     She  has a past surgical history that includes Eye surgery; Hysterectomy; Total thyroidectomy; and pr imp stim,cranial,subq,1 array (Left, 2/4/2016)  Her family history is not on file  She  reports that she has never smoked  She has never used smokeless tobacco  She reports that she does not drink alcohol or use drugs  Current Outpatient Medications on File Prior to Visit   Medication Sig    acetaminophen (TYLENOL) 500 mg tablet Take 500 mg by mouth every 4 (four) hours as needed for mild pain    Acetaminophen 325 MG/10 15ML SOLN Take 1-2 tablets by mouth every 4 (four) hours as needed    aspirin 81 mg chewable tablet Chew 81 mg    atenolol (TENORMIN) 25 mg tablet Take 12 5 mg by mouth daily  Only takes 12 5 mg    benzocaine (ORAJEL) 10 % mucosal gel Apply to the mouth or throat as needed for mucositis    benzocaine-menthol (CEPACOL) 15-3 6 mg per lozenge 1 lozenge by Transmucosal route every 2 (two) hours as needed    bisacodyl (DULCOLAX) 5 mg EC tablet Take 10 mg by mouth daily as needed for constipation    carbidopa-levodopa (SINEMET)  mg per tablet Take 1 5 tablets by mouth 5 (five) times a day 5-1-59-3-6pm      Cetirizine HCl 5 MG/5ML SYRP Take 5 mg by mouth    cholecalciferol (VITAMIN D3) 1,000 units tablet Take 2,000 Units by mouth   Cholecalciferol 1000 units CHEW Chew 1,000 Units    clonazePAM (KlonoPIN) 0 5 mg tablet     dimethicone (REMEDY NUTRASHIELD) 1 % cream Apply topically daily as needed for dry skin    diphenhydrAMINE (BENADRYL) 12 5 MG chewable tablet Chew 12 5 mg 3 (three) times a day as needed for allergies      entacapone (COMTAN) 200 mg tablet Take 200 mg by mouth 2 (two) times a day  For breakthrough tremors    fluticasone (FLONASE) 50 mcg/act nasal spray     gabapentin (NEURONTIN) 300 mg capsule     hypromellose (NATURES TEARS) 0 4 % SOLN Apply to eye    insulin aspart (NovoLOG) 100 Units/mL injection pen Inject 5 Units under the skin Three times daily as needed    insulin glargine (LANTUS SOLOSTAR) 100 units/mL injection pen Inject 5 Units under the skin    levothyroxine 50 mcg tablet Take 1 tablet by mouth daily    meclizine (ANTIVERT) 12 5 MG tablet Take 12 5 mg by mouth Three times daily as needed    Menthol, Topical Analgesic, (BIOFREEZE) 4 % GEL Apply topically    mineral oil enema Insert 1 enema into the rectum once    omeprazole (PRILOSEC) 20 mg delayed release capsule Take 20 mg by mouth    ondansetron (ZOFRAN) 4 mg tablet Take 4 mg by mouth every 8 (eight) hours    polyethylene glycol (MIRALAX) 17 g packet Take 17 g by mouth daily    rivastigmine (EXELON) 1 5 mg capsule Take 1 5 mg by mouth 2 (two) times a day   Rubber Goods (ENEMA BOTTLE) MISC by Does not apply route as needed    senna-docusate sodium (SENOKOT S) 8 6-50 mg per tablet Take 1 tablet by mouth daily as needed for constipation   sertraline (ZOLOFT) 100 mg tablet Take 100 mg by mouth daily at bedtime  No current facility-administered medications on file prior to visit       Review of Systems   Constitutional: Negative  Eyes: Negative  Endocrine: Negative  Musculoskeletal: Positive for gait problem  Allergic/Immunologic: Negative  Hematological:        DM         Objective:      /67 (BP Location: Left arm)   Pulse 78   Temp 97 7 °F (36 5 °C) (Tympanic)   Resp 16   Ht 5' 2" (1 575 m)   Wt 67 1 kg (148 lb)   BMI 27 07 kg/m²          Physical Exam   Constitutional: She is oriented to person, place, and time  She appears well-developed  In wheelchair   HENT:   Head: Normocephalic  Eyes: Pupils are equal, round, and reactive to light  Cardiovascular: Normal rate  Pulmonary/Chest: Effort normal    Neurological: She is alert and oriented to person, place, and time  No cranial nerve deficit or sensory deficit

## 2019-03-11 ENCOUNTER — TELEPHONE (OUTPATIENT)
Dept: NEUROSURGERY | Facility: CLINIC | Age: 79
End: 2019-03-11

## 2019-03-11 DIAGNOSIS — E11.9 TYPE 2 DIABETES MELLITUS WITHOUT COMPLICATION, WITHOUT LONG-TERM CURRENT USE OF INSULIN (HCC): Primary | ICD-10-CM

## 2019-03-11 DIAGNOSIS — Z01.818 PREOPERATIVE EXAMINATION: ICD-10-CM

## 2019-03-11 NOTE — TELEPHONE ENCOUNTER
Signed surgical consent after explained by Dr Gramajo:REPLACEMENT Kelton Mendiola (IPG) DEEP BRAIN STIMULATION (DBS), LEFT (Left Chest)--3/15/2019  HO adverse effect to anesthesia:denies   Comorbid Medical Condition (s) System Assessment:  Cardiac: denies need EKG___  Pulmonary:Denies   Endocrine:new DX DM2--Labs 1/21/2019--->  HgA1C 9 1    Glucose  194  EAG  211 --endocrine consult appointment 3/12/ Dr Bonilla Hidden   Misc/Oncology:denies   Skin Integrity Intactness/ Diversion devices (colostomy, urostomy, ileostomy,)--intact  Personal History Venous thromboembolic disease:  Imagining Requirement: CT Brain 1/23/2019 --images in PACS  Pain Management: n/a   Hold Medications requirement pre-&- post-surgical procedure (AC/antiplatelets, ASA, NSAID, Vitamins, dietary supplements, OTC, DMARDs etc   Medications reviewed cross referenced with medication hold list, provided medication hold list  ASA 18 mg daily last dose 3/11  Hold now will hold Tuesday -Wed -Thursday and Friday --day of surgery ---continue hold 14 day after surgery  Overview of surgical process discussed in detail from this appointment thru 6 weeks  Postop: fax preoperative packet   Patient--discussed with NH nurse Katherine  verbalized and understanding and contact information provided if questions arise  DBS: PD w/ DBS     [facility] [address] [facility] or Katherine ---phone  [phone]   Fax [phone]   Refer to Media--- Nursing packet w/records and Labs in Jan completd at [place]

## 2019-03-12 ENCOUNTER — TELEPHONE (OUTPATIENT)
Dept: NEUROSURGERY | Facility: CLINIC | Age: 79
End: 2019-03-12

## 2019-03-12 LAB
HBA1C MFR BLD HPLC: 7.9 %
HBA1C MFR BLD HPLC: 7.9 %

## 2019-03-12 RX ORDER — GLIMEPIRIDE 1 MG/1
1 TABLET ORAL
COMMUNITY

## 2019-03-12 NOTE — TELEPHONE ENCOUNTER
Received call from Devonte Ma said he was able to use a controller from another patient to disable the system to complete EKG  No further action needed

## 2019-03-12 NOTE — TELEPHONE ENCOUNTER
Received call from Robbi Palma reporting issues with EKG in prep for gen replacement on 3/15/2019 -  Getting artifact due to chest wall IPG  Patient unable to find her remote  Yobaniabdias Rueda of [employer] [phone] - he will contact Caitlyn to arrange a visit with patient tomorrow to assist with turning the system off for the duration of the EKG

## 2019-03-13 ENCOUNTER — TELEPHONE (OUTPATIENT)
Dept: ENDOCRINOLOGY | Facility: CLINIC | Age: 79
End: 2019-03-13

## 2019-03-13 ENCOUNTER — OFFICE VISIT (OUTPATIENT)
Dept: ENDOCRINOLOGY | Facility: CLINIC | Age: 79
End: 2019-03-13
Payer: COMMERCIAL

## 2019-03-13 VITALS — SYSTOLIC BLOOD PRESSURE: 120 MMHG | DIASTOLIC BLOOD PRESSURE: 78 MMHG

## 2019-03-13 DIAGNOSIS — E11.9 TYPE 2 DIABETES MELLITUS WITHOUT COMPLICATION, WITHOUT LONG-TERM CURRENT USE OF INSULIN (HCC): Primary | ICD-10-CM

## 2019-03-13 DIAGNOSIS — Z96.89 S/P DEEP BRAIN STIMULATOR PLACEMENT: ICD-10-CM

## 2019-03-13 DIAGNOSIS — G20 PARKINSON'S DISEASE (HCC): ICD-10-CM

## 2019-03-13 DIAGNOSIS — Z01.818 PREOPERATIVE EXAMINATION: ICD-10-CM

## 2019-03-13 PROCEDURE — 99204 OFFICE O/P NEW MOD 45 MIN: CPT | Performed by: INTERNAL MEDICINE

## 2019-03-13 NOTE — PATIENT INSTRUCTIONS
Continue glimepiride 1 mg with breakfast daily, hold medication if patient is not eating, nausea vomiting, or poor appetite or NPO for procedure  Check blood sugar before breakfast and before dinner, call if blood sugar less than 70 or more than 300, more than twice a week  Hold Amaryl on day of surgery, she can resume Amaryl once start eating  Hypoglycemia instructions   Saray Usha  [birth year]  [id number]    Low Blood Sugar    Steps to treat low blood sugar  1  Test blood sugar if you have symptoms of low blood sugar:   Low Blood Sugar Symptoms:  o Sweaty  o Dizzy  o Rapid heartbeat  o Shaky    o Bad mood  o Hungry      2  Treat blood sugar less than 70 with 15 grams of fast-acting carbohydrate:   Examples of 15 grams Fast-Acting Carbohydrate:  o 4 oz juice  o 4 oz regular soda  o 3-4 glucose tablets (chew)  o 3-4 hard candies (chew)              3    Wait 15 minutes and test your blood sugar again           4   If blood sugar is less than 100, repeat steps 2-3       5  When your blood sugar is 100 or more, eat a snack if it will be longer than one hour until your next meal  The snack should be 15 grams of carbohydrate and a protein:   Examples of snacks:  o ½ sandwich  o 6 crackers with cheese  o Piece of fruit with cheese or peanut butter  o 6 crackers with peanut butter

## 2019-03-13 NOTE — LETTER
March 13, 2019     Halle Mai MD  [facility]    Patient: Veronica Romero   YOB: 1940   Date of Visit: 3/13/2019       Dear Dr Oneill:    Thank you for referring Aldair Waller to me for evaluation  Below are my notes for this consultation  If you have questions, please do not hesitate to call me  I look forward to following your patient along with you  Sincerely,        Wayne Zavala MD        CC: Brandy Fagan, Darlene Rob MD  3/13/2019 11:47 AM  Sign at close encounter      Established Patient Progress Note           History of Present Illness:   Veronica Romero is a 66 y o  female with a history of type 2 diabetes without long term use of insulin since January 2019  Most recent A1c is 7 9% from March 12, 4579    Reports complications of diabetes such as neuropathy  Denies recent illness or hospitalizations  Denies recent severe hypoglycemic or severe hyperglycemic episodes  Denies any issues with her current regimen  home glucose monitoring: are performed regularly  Schedule to have replacement of implantable pulse generator deep brain stimulator, she is referred here from neurosurgery for preoperative clearance from diabetes standpoint of    Home blood glucose readings:   Before breakfast:  120-150  Before lunch:  Does not check  Before dinner:  130-170     Current regimen:   Novolin R 5 units twice a day, if blood sugar is more than 400, she has not received this as her blood sugars are in 120-170  glimepiride 1 mg in the morning with breakfast    Last Eye Exam:  Up-to-date    Thyroid disorders:   For hypothyroidism she is taking levothyroxine 50 mcg daily, 1 hour before breakfast   Denies missing doses  Last TSH is 2 1 from December 2018  She is followed by primary care physician for thyroid problems  Patient Active Problem List   Diagnosis    Parkinson's disease with use of electrical brain stimulation ([facility]    S/P deep brain stimulator placement    Apraxia of eyelid    Parkinson's disease ([facility] [address] )      Past Medical History:   Diagnosis Date    Anxiety     Depression     [facility]     Disease of thyroid gland     GERD (gastroesophageal reflux disease)     Hypertension     [facility]     S/P deep brain stimulator placement       Past Surgical History:   Procedure Laterality Date    BREAST SURGERY Right     lumpectomy    CATARACT EXTRACTION      EYE SURGERY      HYSTERECTOMY      GA IMP STIM,CRANIAL,SUBQ,1 ARRAY Left 2/4/2016    Procedure: REPLACEMENT OF LEFT CHEST DBS IPG;  Surgeon: Orville Tsai MD;  [facility];  Service: Neurosurgery    TOTAL THYROIDECTOMY        No family history on file  Social History     Tobacco Use    Smoking status: Never Smoker    Smokeless tobacco: Never Used   Substance Use Topics    Alcohol use: No     Allergies   Allergen Reactions    Codeine      Other reaction(s): Other (See Comments)  "dry Heaves"  Severe confusion    Morphine And Related      Severe confusion    Sulfa Antibiotics      Other reaction(s): Other (See Comments)  Respiratory problems    Topiramate      Other reaction(s): Confusion - together with Darvocet    Latex Rash     Other reaction(s):  Other (See Comments)  rash/itching         Current Outpatient Medications:     acetaminophen (TYLENOL) 500 mg tablet, Take 500 mg by mouth every 4 (four) hours as needed for mild pain, Disp: , Rfl:     bisacodyl (DULCOLAX) 5 mg EC tablet, Take 10 mg by mouth daily as needed for constipation, Disp: , Rfl:     carbidopa-levodopa (SINEMET)  mg per tablet, Take 1 5 tablets by mouth 5 (five) times a day 2-6-89-3-6pm  , Disp: , Rfl:     Cetirizine HCl 5 MG/5ML SYRP, Take 5 mg by mouth, Disp: , Rfl:     cholecalciferol (VITAMIN D3) 1,000 units tablet, Take 2,000 Units by mouth , Disp: , Rfl:     Cholecalciferol 1000 units CHEW, Chew 2,000 Units , Disp: , Rfl:     clonazePAM (KlonoPIN) 0 5 mg tablet, , Disp: , Rfl:     fluticasone (FLONASE) 50 mcg/act nasal spray, , Disp: , Rfl:     gabapentin (NEURONTIN) 300 mg capsule, , Disp: , Rfl:     glimepiride (AMARYL) 1 mg tablet, Take 1 mg by mouth every morning before breakfast, Disp: , Rfl:     glucose blood (ACCU-CHEK COMPACT PLUS) test strip, 1 each by Other route as needed Use to test blood sugars twice daily, Disp: , Rfl:     hypromellose (NATURES TEARS) 0 4 % SOLN, Apply to eye, Disp: , Rfl:     insulin regular (NOVOLIN R) 100 units/mL injection, Inject 5 Units under the skin As needed two times daily, Disp: , Rfl:     levothyroxine 50 mcg tablet, Take 1 tablet by mouth daily, Disp: , Rfl:     meclizine (ANTIVERT) 12 5 MG tablet, Take 12 5 mg by mouth Three times daily as needed, Disp: , Rfl:     mineral oil enema, Insert 1 enema into the rectum once, Disp: , Rfl:     omeprazole (PRILOSEC) 20 mg delayed release capsule, Take 20 mg by mouth, Disp: , Rfl:     polyethylene glycol (MIRALAX) 17 g packet, Take 17 g by mouth daily, Disp: , Rfl:     senna-docusate sodium (SENOKOT S) 8 6-50 mg per tablet, Take 1 tablet by mouth daily as needed for constipation  , Disp: , Rfl:     sertraline (ZOLOFT) 100 mg tablet, Take 100 mg by mouth daily at bedtime  , Disp: , Rfl:     aspirin 81 mg chewable tablet, Chew 81 mg, Disp: , Rfl:     insulin aspart (NovoLOG) 100 Units/mL injection pen, Inject 5 Units under the skin Three times daily as needed, Disp: , Rfl:     Review of Systems   Constitutional: Positive for fatigue  HENT: Negative  Respiratory: Negative  Gastrointestinal: Negative  Endocrine: Negative for polydipsia, polyphagia and polyuria  Genitourinary: Negative  Musculoskeletal: Positive for arthralgias  Neurological: Positive for dizziness, tremors and weakness  Psychiatric/Behavioral: The patient is nervous/anxious  Physical Exam:  There is no height or weight on file to calculate BMI    /78    Wt Readings from Last 3 Encounters:   03/07/19 67 1 kg (148 lb)   02/15/18 65 8 kg (145 lb)   07/13/17 65 8 kg (145 lb)       Physical Exam   Constitutional: She is oriented to person, place, and time  She appears well-developed and well-nourished  No distress  Wheelchair-bound   HENT:   Head: Normocephalic and atraumatic  Eyes: Conjunctivae and EOM are normal  Right eye exhibits no discharge  Left eye exhibits no discharge  Neck: Normal range of motion  Neck supple  No thyromegaly present  Cardiovascular: Normal rate and regular rhythm  No murmur heard  Pulmonary/Chest: Effort normal and breath sounds normal  No respiratory distress  She has no wheezes  Abdominal: She exhibits no distension  There is no tenderness  Musculoskeletal: She exhibits no edema  Neurological: She is alert and oriented to person, place, and time  She has normal reflexes  Skin: Skin is warm and dry  No rash noted  She is not diaphoretic  No erythema  Psychiatric: She has a normal mood and affect  Her behavior is normal    Vitals reviewed  Diabetic Foot Exam    Labs:   Lab Results   Component Value Date    HGBA1C 7 9 03/12/2019       Impression & Plan:    Problem List Items Addressed This Visit        Nervous and Auditory    Parkinson's disease ([facility] [address] )       Other    S/P deep brain stimulator placement      Other Visit Diagnoses     Type 2 diabetes mellitus without complication, without long-term current use of insulin ([facility] [address] )    -  Primary    A1c 7 9%, her blood sugars are in 120-170 range, optimal range for surgery clearance  Will clear her from diabetes standpoint of he will for surgery as mentioned above, deep brain stimulator replacement surgery  Continue Amaryl 1 mg daily before breakfast  Discussed to hold Amaryl on the day of surgery as she is going to be NPO, she can resume Amaryl once she starts eating    Continue to monitor blood sugar twice daily, call if blood sugar less than 70 or more than 300 mg/dl  She prefers to follow-up with primary care physician at nursing home for diabetes management, if there are any issues with diabetes control she will make follow-up appointment with us  Plan also discussed with PCP      Relevant Medications    insulin regular (NOVOLIN R) 100 units/mL injection, only if blood sugar is more than 400 mg/dl    Preoperative examination       See above     Hypothyroidism  Managed by PCP  Continue current dose of levothyroxine      Patient Instructions   Continue glimepiride 1 mg with breakfast daily, hold medication if patient is not eating, nausea vomiting, or poor appetite or NPO for procedure  Check blood sugar before breakfast and before dinner, call if blood sugar less than 70 or more than 300, more than twice a week  Hold Amaryl on day of surgery, she can resume Amaryl once start eating  Hypoglycemia instructions   Minda Benavidez  5/[birth year]32  [id number]    Low Blood Sugar    Steps to treat low blood sugar  1  Test blood sugar if you have symptoms of low blood sugar:   Low Blood Sugar Symptoms:  o Sweaty  o Dizzy  o Rapid heartbeat  o Shaky    o Bad mood  o Hungry      2  Treat blood sugar less than 70 with 15 grams of fast-acting carbohydrate:   Examples of 15 grams Fast-Acting Carbohydrate:  o 4 oz juice  o 4 oz regular soda  o 3-4 glucose tablets (chew)  o 3-4 hard candies (chew)              3    Wait 15 minutes and test your blood sugar again           4  If blood sugar is less than 100, repeat steps 2-3       5  When your blood sugar is 100 or more, eat a snack if it will be longer than one hour until your next meal  The snack should be 15 grams of carbohydrate and a protein:   Examples of snacks:  o ½ sandwich  o 6 crackers with cheese  o Piece of fruit with cheese or peanut butter  o 6 crackers with peanut butter      s       Counseled patient on diagnostic results, prognosis, risk and benefit of treatment options, instruction for management, importance of treatment compliance, Risk factor reduction and impressions    Hieu Pierce MD

## 2019-03-13 NOTE — PROGRESS NOTES
Established Patient Progress Note           History of Present Illness:   Archie Briones is a 66 y o  female with a history of type 2 diabetes without long term use of insulin since January 2019  Most recent A1c is 7 9% from March 12, 7219    Reports complications of diabetes such as neuropathy  Denies recent illness or hospitalizations  Denies recent severe hypoglycemic or severe hyperglycemic episodes  Denies any issues with her current regimen  home glucose monitoring: are performed regularly  Schedule to have replacement of implantable pulse generator deep brain stimulator, she is referred here from neurosurgery for preoperative clearance from diabetes standpoint of    Home blood glucose readings:   Before breakfast:  120-150  Before lunch:  Does not check  Before dinner:  130-170     Current regimen:   Novolin R 5 units twice a day, if blood sugar is more than 400, she has not received this as her blood sugars are in 120-170  glimepiride 1 mg in the morning with breakfast    Last Eye Exam:  Up-to-date    Thyroid disorders:   For hypothyroidism she is taking levothyroxine 50 mcg daily, 1 hour before breakfast   Denies missing doses  Last TSH is 2 1 from December 2018  She is followed by primary care physician for thyroid problems  Patient Active Problem List   Diagnosis    [facility]'s disease with use of electrical brain stimulation ([facility] [address] [facility]    S/P deep brain stimulator placement    Apraxia of eyelid    Parkinson's disease ([address] )      Past Medical History:   Diagnosis Date    Anxiety     Depression     Diabetes mellitus ([address]     Disease of thyroid gland     GERD (gastroesophageal reflux disease)     Hypertension     Parkinson's disease ([address]     S/P deep brain stimulator placement       Past Surgical History:   Procedure Laterality Date    BREAST SURGERY Right     lumpectomy    CATARACT EXTRACTION      EYE SURGERY      HYSTERECTOMY      MI IMP STIM,CRANIAL,SUBQ,1 ARRAY Left 2/4/2016 Procedure: REPLACEMENT OF LEFT CHEST DBS IPG;  Surgeon: Dea Rush MD;  [facility];  Service: Neurosurgery    TOTAL THYROIDECTOMY        No family history on file  Social History     Tobacco Use    Smoking status: Never Smoker    Smokeless tobacco: Never Used   Substance Use Topics    Alcohol use: No     Allergies   Allergen Reactions    Codeine      Other reaction(s): Other (See Comments)  "dry Heaves"  Severe confusion    Morphine And Related      Severe confusion    Sulfa Antibiotics      Other reaction(s): Other (See Comments)  Respiratory problems    Topiramate      Other reaction(s): Confusion - together with Darvocet    Latex Rash     Other reaction(s):  Other (See Comments)  rash/itching         Current Outpatient Medications:     acetaminophen (TYLENOL) 500 mg tablet, Take 500 mg by mouth every 4 (four) hours as needed for mild pain, Disp: , Rfl:     bisacodyl (DULCOLAX) 5 mg EC tablet, Take 10 mg by mouth daily as needed for constipation, Disp: , Rfl:     carbidopa-levodopa (SINEMET)  mg per tablet, Take 1 5 tablets by mouth 5 (five) times a day 4-9-66-3-6pm  , Disp: , Rfl:     Cetirizine HCl 5 MG/5ML SYRP, Take 5 mg by mouth, Disp: , Rfl:     cholecalciferol (VITAMIN D3) 1,000 units tablet, Take 2,000 Units by mouth , Disp: , Rfl:     Cholecalciferol 1000 units CHEW, Chew 2,000 Units , Disp: , Rfl:     clonazePAM (KlonoPIN) 0 5 mg tablet, , Disp: , Rfl:     fluticasone (FLONASE) 50 mcg/act nasal spray, , Disp: , Rfl:     gabapentin (NEURONTIN) 300 mg capsule, , Disp: , Rfl:     glimepiride (AMARYL) 1 mg tablet, Take 1 mg by mouth every morning before breakfast, Disp: , Rfl:     glucose blood (ACCU-CHEK COMPACT PLUS) test strip, 1 each by Other route as needed Use to test blood sugars twice daily, Disp: , Rfl:     hypromellose (NATURES TEARS) 0 4 % SOLN, Apply to eye, Disp: , Rfl:     insulin regular (NOVOLIN R) 100 units/mL injection, Inject 5 Units under the skin As needed two times daily, Disp: , Rfl:     levothyroxine 50 mcg tablet, Take 1 tablet by mouth daily, Disp: , Rfl:     meclizine (ANTIVERT) 12 5 MG tablet, Take 12 5 mg by mouth Three times daily as needed, Disp: , Rfl:     mineral oil enema, Insert 1 enema into the rectum once, Disp: , Rfl:     omeprazole (PRILOSEC) 20 mg delayed release capsule, Take 20 mg by mouth, Disp: , Rfl:     polyethylene glycol (MIRALAX) 17 g packet, Take 17 g by mouth daily, Disp: , Rfl:     senna-docusate sodium (SENOKOT S) 8 6-50 mg per tablet, Take 1 tablet by mouth daily as needed for constipation  , Disp: , Rfl:     sertraline (ZOLOFT) 100 mg tablet, Take 100 mg by mouth daily at bedtime  , Disp: , Rfl:     aspirin 81 mg chewable tablet, Chew 81 mg, Disp: , Rfl:     insulin aspart (NovoLOG) 100 Units/mL injection pen, Inject 5 Units under the skin Three times daily as needed, Disp: , Rfl:     Review of Systems   Constitutional: Positive for fatigue  HENT: Negative  Respiratory: Negative  Gastrointestinal: Negative  Endocrine: Negative for polydipsia, polyphagia and polyuria  Genitourinary: Negative  Musculoskeletal: Positive for arthralgias  Neurological: Positive for dizziness, tremors and weakness  Psychiatric/Behavioral: The patient is nervous/anxious  Physical Exam:  There is no height or weight on file to calculate BMI  /78    Wt Readings from Last 3 Encounters:   03/07/19 67 1 kg (148 lb)   02/15/18 65 8 kg (145 lb)   07/13/17 65 8 kg (145 lb)       Physical Exam   Constitutional: She is oriented to person, place, and time  She appears well-developed and well-nourished  No distress  Wheelchair-bound   HENT:   Head: Normocephalic and atraumatic  Eyes: Conjunctivae and EOM are normal  Right eye exhibits no discharge  Left eye exhibits no discharge  Neck: Normal range of motion  Neck supple  No thyromegaly present  Cardiovascular: Normal rate and regular rhythm     No murmur heard   Pulmonary/Chest: Effort normal and breath sounds normal  No respiratory distress  She has no wheezes  Abdominal: She exhibits no distension  There is no tenderness  Musculoskeletal: She exhibits no edema  Neurological: She is alert and oriented to person, place, and time  She has normal reflexes  Skin: Skin is warm and dry  No rash noted  She is not diaphoretic  No erythema  Psychiatric: She has a normal mood and affect  Her behavior is normal    Vitals reviewed  Diabetic Foot Exam    Labs:   Lab Results   Component Value Date    HGBA1C 7 9 03/12/2019       Impression & Plan:    Problem List Items Addressed This Visit        Nervous and Auditory    [facility]       Other    S/P deep brain stimulator placement      Other Visit Diagnoses     Type 2 diabetes mellitus without complication, without long-term current use of insulin ([facility]    -  Primary    A1c 7 9%, her blood sugars are in 120-170 range, optimal range for surgery clearance  Will clear her from diabetes standpoint of he will for surgery as mentioned above, deep brain stimulator replacement surgery  Continue Amaryl 1 mg daily before breakfast  Discussed to hold Amaryl on the day of surgery as she is going to be NPO, she can resume Amaryl once she starts eating    Continue to monitor blood sugar twice daily, call if blood sugar less than 70 or more than 300 mg/dl  She prefers to follow-up with primary care physician at nursing home for diabetes management, if there are any issues with diabetes control she will make follow-up appointment with   Plan also discussed with PCP      Relevant Medications    insulin regular (NOVOLIN R) 100 units/mL injection, only if blood sugar is more than 400 mg/dl    Preoperative examination       See above     Hypothyroidism  Managed by PCP  Continue current dose of levothyroxine      Patient Instructions   Continue glimepiride 1 mg with breakfast daily, hold medication if patient is not eating, nausea vomiting, or poor appetite or NPO for procedure  Check blood sugar before breakfast and before dinner, call if blood sugar less than 70 or more than 300, more than twice a week  Hold Amaryl on day of surgery, she can resume Amaryl once start eating  Hypoglycemia instructions   Jessika Arteaga  5/72[birth year]2  [id number]    Low Blood Sugar    Steps to treat low blood sugar  1  Test blood sugar if you have symptoms of low blood sugar:   Low Blood Sugar Symptoms:  o Sweaty  o Dizzy  o Rapid heartbeat  o Shaky    o Bad mood  o Hungry      2  Treat blood sugar less than 70 with 15 grams of fast-acting carbohydrate:   Examples of 15 grams Fast-Acting Carbohydrate:  o 4 oz juice  o 4 oz regular soda  o 3-4 glucose tablets (chew)  o 3-4 hard candies (chew)              3    Wait 15 minutes and test your blood sugar again           4  If blood sugar is less than 100, repeat steps 2-3       5  When your blood sugar is 100 or more, eat a snack if it will be longer than one hour until your next meal  The snack should be 15 grams of carbohydrate and a protein:   Examples of snacks:  o ½ sandwich  o 6 crackers with cheese  o Piece of fruit with cheese or peanut butter  o 6 crackers with peanut butter      s       Counseled patient on diagnostic results, prognosis, risk and benefit of treatment options, instruction for management, importance of treatment compliance, Risk  factor reduction and impressions    Omer Dalal MD

## 2019-03-13 NOTE — TELEPHONE ENCOUNTER
As per Bernadette @ [facility] patient is living in, she called her PCP and they stated her insurance does not require her to have an insurance referral

## 2019-03-14 ENCOUNTER — DOCUMENTATION (OUTPATIENT)
Dept: NEUROSURGERY | Facility: CLINIC | Age: 79
End: 2019-03-14

## 2019-03-14 ENCOUNTER — ANESTHESIA EVENT (OUTPATIENT)
Dept: PERIOP | Facility: HOSPITAL | Age: 79
End: 2019-03-14
Payer: COMMERCIAL

## 2019-03-14 ENCOUNTER — TELEPHONE (OUTPATIENT)
Dept: NEUROSURGERY | Facility: CLINIC | Age: 79
End: 2019-03-14

## 2019-03-14 DIAGNOSIS — Z98.890 POSTOPERATIVE STATE: Primary | ICD-10-CM

## 2019-03-14 RX ORDER — CEPHALEXIN 500 MG/1
CAPSULE ORAL
Qty: 12 CAPSULE | Refills: 0 | Status: SHIPPED | OUTPATIENT
Start: 2019-03-15 | End: 2019-03-18

## 2019-03-14 NOTE — TELEPHONE ENCOUNTER
Pre operative call day prior surgery scheduled in the AM w/ Dr Graciela Agarwal (IPG) DEEP BRAIN STIMULATION (DBS), LEFT (Left Chest)--3/15/2019   Discussion/Review    Allergies ---Reviewed   Hold medications --- Reviewed ASA last dose Monday 3/11/19 ---held since Tuesday late afternoon case Friday  NPO after MN, night prior surgery ---Reviewed  Medication (s) instructed by healthcare provider to take the morning of surgery w/ sip of water 4 OZ discussed:as per ambulatory , endocrine note Hold Amaryl on day of surgery, she can resume Amaryl once start eating      Post operative scripts electronic transmission: [facility] request Fax script they will order thru [facility] pharmacy [facility] --faxed script attention katherine --refer to next note w/ scanned copy  PDMP site reviewed accessed and reviewed scheduled drug list printed and scanned into record  Pain management script: take  mg every 4 hours  Or 1000 mg every 8 hours ----KATHERINE will secure order from [facility] Romo Bending- operative shower protocol reviewed; Clarify instructions as per protocol, third chlorhexidine shower tonight before surgery, then use TYRONE wipes as per packaging instructions, Use a clean towel and wash cloth starting tonight and continue nightly until seen 2 weeks post operative visit for incision check removal  Change bed linens tonight and continue at least 1-2 times weekly  --reinforced     Informed will receive a telephone call tonight from a hospital representative with time to report on surgery day: --reinforced   Informed will receive a f/u call within in 24 -48 hours post-op to assess recovery reinforce instructions, and to answer any questions  --reinforced   Follow-up appointments reviewed 3/29/2019   Patient-discussed with Katherine -charge nurse at [facility]  verbalized understanding information provided /discussed      Phone [phone]

## 2019-03-14 NOTE — PROGRESS NOTES
Attachment script faxed to [facility] ---  refer to telephone note today for details of preoperative call etc

## 2019-03-15 ENCOUNTER — HOSPITAL ENCOUNTER (OUTPATIENT)
Facility: HOSPITAL | Age: 79
Setting detail: OUTPATIENT SURGERY
Discharge: NURSING, REHAB OR LONG-TERM CARE | End: 2019-03-15
Attending: NEUROLOGICAL SURGERY | Admitting: NEUROLOGICAL SURGERY
Payer: COMMERCIAL

## 2019-03-15 ENCOUNTER — ANESTHESIA (OUTPATIENT)
Dept: PERIOP | Facility: HOSPITAL | Age: 79
End: 2019-03-15
Payer: COMMERCIAL

## 2019-03-15 VITALS
TEMPERATURE: 97.8 F | RESPIRATION RATE: 22 BRPM | HEART RATE: 90 BPM | HEIGHT: 62 IN | WEIGHT: 143.25 LBS | DIASTOLIC BLOOD PRESSURE: 61 MMHG | BODY MASS INDEX: 26.36 KG/M2 | OXYGEN SATURATION: 92 % | SYSTOLIC BLOOD PRESSURE: 134 MMHG

## 2019-03-15 LAB
GLUCOSE SERPL-MCNC: 104 MG/DL (ref 65–140)
GLUCOSE SERPL-MCNC: 120 MG/DL (ref 65–140)

## 2019-03-15 PROCEDURE — C1787 PATIENT PROGR, NEUROSTIM: HCPCS | Performed by: NEUROLOGICAL SURGERY

## 2019-03-15 PROCEDURE — 82948 REAGENT STRIP/BLOOD GLUCOSE: CPT

## 2019-03-15 PROCEDURE — C1767 GENERATOR, NEURO NON-RECHARG: HCPCS | Performed by: NEUROLOGICAL SURGERY

## 2019-03-15 PROCEDURE — 61885 INSRT/REDO NEUROSTIM 1 ARRAY: CPT | Performed by: NEUROLOGICAL SURGERY

## 2019-03-15 DEVICE — NEUROSTIM ACTIVA PC TI DUAL CHAMBER: Type: IMPLANTABLE DEVICE | Site: CHEST  WALL | Status: FUNCTIONAL

## 2019-03-15 RX ORDER — SODIUM CHLORIDE 9 MG/ML
INJECTION, SOLUTION INTRAVENOUS CONTINUOUS PRN
Status: DISCONTINUED | OUTPATIENT
Start: 2019-03-15 | End: 2019-03-15 | Stop reason: SURG

## 2019-03-15 RX ORDER — PROPOFOL 10 MG/ML
INJECTION, EMULSION INTRAVENOUS CONTINUOUS PRN
Status: DISCONTINUED | OUTPATIENT
Start: 2019-03-15 | End: 2019-03-15 | Stop reason: SURG

## 2019-03-15 RX ORDER — SODIUM CHLORIDE 9 MG/ML
INJECTION, SOLUTION INTRAVENOUS CONTINUOUS PRN
Status: DISCONTINUED | OUTPATIENT
Start: 2019-03-15 | End: 2019-03-15

## 2019-03-15 RX ORDER — POLYVINYL ALCOHOL 14 MG/ML
1 SOLUTION/ DROPS OPHTHALMIC AS NEEDED
COMMUNITY

## 2019-03-15 RX ORDER — CHLORHEXIDINE GLUCONATE 0.12 MG/ML
15 RINSE ORAL ONCE
Status: COMPLETED | OUTPATIENT
Start: 2019-03-15 | End: 2019-03-15

## 2019-03-15 RX ORDER — CEFAZOLIN SODIUM 2 G/50ML
2000 SOLUTION INTRAVENOUS ONCE
Status: COMPLETED | OUTPATIENT
Start: 2019-03-15 | End: 2019-03-15

## 2019-03-15 RX ORDER — LIDOCAINE HYDROCHLORIDE 10 MG/ML
INJECTION, SOLUTION INFILTRATION; PERINEURAL AS NEEDED
Status: DISCONTINUED | OUTPATIENT
Start: 2019-03-15 | End: 2019-03-15 | Stop reason: SURG

## 2019-03-15 RX ORDER — PROPOFOL 10 MG/ML
INJECTION, EMULSION INTRAVENOUS AS NEEDED
Status: DISCONTINUED | OUTPATIENT
Start: 2019-03-15 | End: 2019-03-15 | Stop reason: SURG

## 2019-03-15 RX ORDER — SODIUM CHLORIDE, SODIUM LACTATE, POTASSIUM CHLORIDE, CALCIUM CHLORIDE 600; 310; 30; 20 MG/100ML; MG/100ML; MG/100ML; MG/100ML
125 INJECTION, SOLUTION INTRAVENOUS CONTINUOUS
Status: DISCONTINUED | OUTPATIENT
Start: 2019-03-15 | End: 2019-03-15 | Stop reason: HOSPADM

## 2019-03-15 RX ORDER — LIDOCAINE HYDROCHLORIDE AND EPINEPHRINE 10; 10 MG/ML; UG/ML
INJECTION, SOLUTION INFILTRATION; PERINEURAL AS NEEDED
Status: DISCONTINUED | OUTPATIENT
Start: 2019-03-15 | End: 2019-03-15 | Stop reason: HOSPADM

## 2019-03-15 RX ADMIN — SODIUM CHLORIDE: 0.9 INJECTION, SOLUTION INTRAVENOUS at 13:43

## 2019-03-15 RX ADMIN — PROPOFOL 50 MG: 10 INJECTION, EMULSION INTRAVENOUS at 14:39

## 2019-03-15 RX ADMIN — CEFAZOLIN SODIUM 2000 MG: 2 SOLUTION INTRAVENOUS at 14:09

## 2019-03-15 RX ADMIN — PROPOFOL 80 MCG/KG/MIN: 10 INJECTION, EMULSION INTRAVENOUS at 14:39

## 2019-03-15 RX ADMIN — CHLORHEXIDINE GLUCONATE 15 ML: 1.2 RINSE ORAL at 13:23

## 2019-03-15 RX ADMIN — LIDOCAINE HYDROCHLORIDE ANHYDROUS 50 MG: 10 INJECTION, SOLUTION INFILTRATION at 14:39

## 2019-03-15 NOTE — ANESTHESIA PREPROCEDURE EVALUATION
Review of Systems/Medical History          Cardiovascular  Hypertension ,    Pulmonary       GI/Hepatic    GERD ,             Endo/Other  Diabetes , History of thyroid disease ,      GYN       Hematology   Musculoskeletal       Neurology    Neuromuscular disease ,    Psychology   Anxiety, Depression ,              Physical Exam    Airway    Mallampati score: II         Dental   No notable dental hx     Cardiovascular      Pulmonary      Other Findings        Anesthesia Plan  ASA Score- 3     Anesthesia Type- IV sedation with anesthesia with ASA Monitors  Additional Monitors:   Airway Plan:     Comment: I, Dr Urbano Tong, the attending physician, have personally seen and evaluated the patient prior to anesthetic care  I have reviewed the pre-anesthetic record, and other medical records if appropriate to the anesthetic care  If a CRNA is involved in the case, I have reviewed the CRNA assessment, if present, and agree  The patient is in a suitable condition to proceed with my formulated anesthetic plan        Plan Factors-    Induction- intravenous  Postoperative Plan-     Informed Consent- Anesthetic plan and risks discussed with patient  I personally reviewed this patient with the CRNA  Discussed and agreed on the Anesthesia Plan with the CRNA  Ruthy Branch

## 2019-03-15 NOTE — OP NOTE
OPERATIVE REPORT  PATIENT NAME: Veronica Romero    :  1940  MRN: [id number]  Pt Location: AN OR ROOM 04    SURGERY DATE: 3/15/2019    Surgeon(s) and Role:     * Shelbie Hill MD - Primary    Preop Diagnosis:  Parkinson's disease ([facility] [address] ) [G20]    Post-Op Diagnosis Codes:     * Parkinson's disease ([facility] [address] ) [G20]    Procedure(s) (LRB):  REPLACEMENT IMPLANTABLE PULSE GENERATOR (IPG) DEEP BRAIN STIMULATION (DBS), LEFT (Left)    Specimen(s):  * No specimens in log *    Estimated Blood Loss:   Minimal    Drains:  * No LDAs found *    Anesthesia Type:   IV Sedation with Anesthesia    Operative Indications:  Parkinson's disease ([facility] [address] ) [G20]      Operative Findings:  See dictated note    Complications:   None    Procedure and Technique:  The patient was taken to the operative theater and successfully induced under sedation  The patient was positioned supine  The patients prior incision was marked on the left chest  Then the patient was prepped and draped in sterile fashion, a timeout was performed  We began by making an incision along the old scar with a #10 Blade  We then used monopolar cautery to dissect down to the generator       We then removed the old generator and this was disconnected from the electrode using the proprietary screwdriver  Then the new dual channel generator was brought into the field  The new dual channel generator was reconnected with the screwdriver and then the impedances were tested and they were found to be within baseline impedances  Then we placed the new dual channel generator into the pocket and irrigated the wound  We then proceeded to close in layers with 2-0 Vicryl for the deeper tissues and 4-0 running monocryl for the skin       The patient was then was allowed to awaken from she and taken to the recovery area in stable condition  All needle and sponge counts were correct at the end of the procedure       I was present for the entire procedure    Patient Disposition:  PACU     SIGNATURE: Edi Barnes Steph Duvall MD  DATE: March 15, 2019  TIME: 2:28 PM    [facility]

## 2019-03-15 NOTE — ANESTHESIA POSTPROCEDURE EVALUATION
Post-Op Assessment Note    CV Status:  Stable  Pain Score: 0    Pain management: adequate     Mental Status:  Alert and awake   Hydration Status:  Euvolemic   PONV Controlled:  Controlled   Airway Patency:  Patent   Post Op Vitals Reviewed: Yes      Staff: CRNA   Comments: vss sv nonobstructed uneventful          BP  99/54   Temp 98 4   Pulse 79   Resp 18   SpO2 99

## 2019-03-18 ENCOUNTER — TELEPHONE (OUTPATIENT)
Dept: NEUROSURGERY | Facility: CLINIC | Age: 79
End: 2019-03-18

## 2019-03-18 NOTE — TELEPHONE ENCOUNTER
Called Michelle Goldman on primary contact number after surgery 3/15/2019 to check in on recovery and provide post surgical instructions  Got voicemail, left message to callback  Will make another attempt if no callback is received

## 2019-03-19 NOTE — TELEPHONE ENCOUNTER
2nd attempt - Called Yasmani Vines on primary contact number after surgery 3/15/2019 to check in on recovery and provide post surgical instructions  Got voicemail, left message to callback  Will make another attempt if no callback is received

## 2019-03-20 NOTE — TELEPHONE ENCOUNTER
Made another attempt to reach Michelle to see how she is recovering after surgery  Spoke to Katherine her nurse at [facility]  She reports patient is doing well and has refused pain medications  Incision is without s/s of infection  Confirmed followup with us on 3/29/2019 for incision check

## 2019-03-29 ENCOUNTER — CLINICAL SUPPORT (OUTPATIENT)
Dept: NEUROSURGERY | Facility: CLINIC | Age: 79
End: 2019-03-29

## 2019-03-29 VITALS — TEMPERATURE: 97.8 F | SYSTOLIC BLOOD PRESSURE: 116 MMHG | DIASTOLIC BLOOD PRESSURE: 62 MMHG

## 2019-03-29 DIAGNOSIS — Z98.890 POST-OPERATIVE STATE: Primary | ICD-10-CM

## 2019-03-29 PROCEDURE — 99024 POSTOP FOLLOW-UP VISIT: CPT

## 2019-03-29 NOTE — PROGRESS NOTES
Post-Op Visit-Neurosurgery    Jessika Arteaga 66 y o  female MRN: [id number]    Chief Complaint  Patient presents post: REPLACEMENT IMPLANTABLE PULSE GENERATOR (IPG) DEEP BRAIN STIMULATION (DBS), LEFT (Left Chest)    History of Present Illness  Patient presents for 2 week POV for incision check  Arrived accompanied by her daughter and was assisted to exam room in wheelchair  Denies pain at this time and has not had to take medications for relief  Assessment  Wound Exam: Incision is clean, dry, and in tact, well approximated without heat redness, swelling, or drainage  Well healed incision  Procedure  Staple/suture assessment  location: Left chest wall  Complications: None  Incision YUDY  Discussion/Summary  Confirmed she has appointment scheduled with Dr Nani Wood within the next week or two  Reviewed incision care with patient including daily observation for s/s infection including: increased erythema, edema, drainage, dehiscence of incision or fever >101  Should these be observed, she understands that she is to call and/or return immediately for reassessment  Advised patient to continue cleansing area with mild soap and water and pat dry  Not to apply any lotions, creams, or ointments, & not to submerge in any water for two more weeks  She is to maintain activity restrictions until cleared by the surgeon  Activity levels were also reviewed with the patient in detail, she is to lift no greater than 10 pounds, advised to limit bend/twisting and ambulation is encouraged as tolerated  Verified date/time/location of upcoming POV on 4/26/2019   She is to call the office with any further questions or concerns, or if any incisional issues or fevers would arise

## 2019-04-18 ENCOUNTER — PROCEDURE VISIT (OUTPATIENT)
Dept: NEUROLOGY | Facility: CLINIC | Age: 79
End: 2019-04-18
Payer: COMMERCIAL

## 2019-04-18 VITALS — DIASTOLIC BLOOD PRESSURE: 64 MMHG | HEIGHT: 62 IN | SYSTOLIC BLOOD PRESSURE: 116 MMHG | BODY MASS INDEX: 26.2 KG/M2

## 2019-04-18 DIAGNOSIS — G20 PARKINSON'S DISEASE WITH USE OF ELECTRICAL BRAIN STIMULATION (HCC): Primary | ICD-10-CM

## 2019-04-18 PROCEDURE — 95984 ALYS BRN NPGT PRGRMG ADDL 15: CPT | Performed by: PSYCHIATRY & NEUROLOGY

## 2019-04-18 PROCEDURE — 95983 ALYS BRN NPGT PRGRMG 15 MIN: CPT | Performed by: PSYCHIATRY & NEUROLOGY

## 2019-04-26 ENCOUNTER — OFFICE VISIT (OUTPATIENT)
Dept: NEUROSURGERY | Facility: CLINIC | Age: 79
End: 2019-04-26

## 2019-04-26 VITALS
SYSTOLIC BLOOD PRESSURE: 106 MMHG | BODY MASS INDEX: 26.2 KG/M2 | HEIGHT: 62 IN | HEART RATE: 75 BPM | RESPIRATION RATE: 16 BRPM | TEMPERATURE: 97.2 F | DIASTOLIC BLOOD PRESSURE: 67 MMHG

## 2019-04-26 DIAGNOSIS — Z48.89 AFTERCARE FOLLOWING SURGERY: Primary | ICD-10-CM

## 2019-04-26 PROCEDURE — 99024 POSTOP FOLLOW-UP VISIT: CPT | Performed by: NEUROLOGICAL SURGERY

## 2019-07-15 ENCOUNTER — TELEPHONE (OUTPATIENT)
Dept: NEUROLOGY | Facility: CLINIC | Age: 79
End: 2019-07-15

## 2019-07-15 NOTE — TELEPHONE ENCOUNTER
Pt called to re-schedule appt that she had currently scheduled with Dr Fong  Pt was re scheduled for 9/12/19 at 2:30 in the [facility]

## 2019-09-12 ENCOUNTER — PROCEDURE VISIT (OUTPATIENT)
Dept: NEUROLOGY | Facility: CLINIC | Age: 79
End: 2019-09-12
Payer: COMMERCIAL

## 2019-09-12 VITALS
DIASTOLIC BLOOD PRESSURE: 74 MMHG | BODY MASS INDEX: 26.2 KG/M2 | HEART RATE: 73 BPM | SYSTOLIC BLOOD PRESSURE: 122 MMHG | HEIGHT: 62 IN

## 2019-09-12 DIAGNOSIS — G20 PARKINSON'S DISEASE WITH USE OF ELECTRICAL BRAIN STIMULATION (HCC): Primary | ICD-10-CM

## 2019-09-12 PROCEDURE — 99213 OFFICE O/P EST LOW 20 MIN: CPT | Performed by: PSYCHIATRY & NEUROLOGY

## 2019-09-12 RX ORDER — KETOCONAZOLE 20 MG/ML
1 SHAMPOO TOPICAL
COMMUNITY

## 2019-09-12 NOTE — PATIENT INSTRUCTIONS
Parkinsonian symptoms are overall stable  Tremor controlled  No changes will be made in stimulation or medication  She was encouraged to remain socially active and move about the residence

## 2019-09-12 NOTE — PROGRESS NOTES
Patient ID: Star Gutierrez is a 78 y o  female  Assessment/Plan:    Parkinson's disease with use of electrical brain stimulation (HCC)  Parkinsonian symptoms are overall stable  Tremor controlled  No changes will be made in stimulation or medication  She was encouraged to remain socially active and move about the residence  Diagnoses and all orders for this visit:    Parkinson's disease with use of electrical brain stimulation ([address] )    Other orders  -     ketoconazole (NIZORAL) 2 % shampoo; Apply 1 application topically           Subjective:    Ms Fani Juarez is a [facility] resident with Parkinson's disease for over 10 years s/p bilateral STN DBS in May 2012 at [place], IPG replacements 2/4/16, 3/15/19, who presents for follow up  To review, symptoms began around 2005 with leg shaking  Initially diagnosed with RLS  A few months later, she was diagnosed with PD  When initially seen she was on Sinemet 25/100 2 tabs qid (6am then q 4 hours while awake, 4 times daily) and Comtan every other day  Comtan later stopped as was not covered  She remains on Sinemet 1 5tabs 5 times a day q 3 hours  She denies any wearing off  Speech is soft  She has tried speech therapy in the past she felt it did not help  She has a rare tremor in the right hand which last a short period and is not bothersome  She is assisted showering  She is able to dress independently but slowly with occasional assistance  She can walk with an assist of two with scissoring of gait  She denies any dysphagia  She can feel food get stuck drinking water solves this  She sleeps well  She spends most of the day scooting herself in the wheelchair  She transfers to the  toilet independently  She does attend activities  She does go to the dining room for lunch and then plays card  She is on clonazepam 0 5mg 3 times for anxiety  She feels this settles her down           The following portions of the patient's history were reviewed and updated as appropriate: allergies, current medications, past family history, past medical history, past social history, past surgical history and problem list          Objective:    Blood pressure 122/74, pulse 73, height 5' 2" (1 575 m)  Physical Exam   Constitutional: She appears well-developed  Eyes: Pupils are equal, round, and reactive to light  Neurological: She has normal strength  Psychiatric: Her speech is normal    Vitals reviewed  Neurological Exam  Mental Status   Oriented to person, place, time and situation  Recent and remote memory are intact  Speech is normal  Language is fluent with no aphasia  Attention and concentration are normal     Cranial Nerves  CN III, IV, VI: Extraocular movements intact bilaterally  Pupils equal round and reactive to light bilaterally  CN V: Facial sensation is normal   CN VII: Full and symmetric facial movement  CN VIII: Hearing is normal   CN IX, X: Palate elevates symmetrically  CN XI: Shoulder shrug strength is normal   CN XII: Tongue midline without atrophy or fasciculations  Tends to shut left eye at times when activated   Motor   Increased muscle tone  Strength is 5/5 throughout all four extremities  Sensory  Light touch is normal in upper and lower extremities  Coordination  Right: Finger-to-nose normal  Rapid alternating movement abnormality:  Left: Finger-to-nose normal  Rapid alternating movement abnormality:  See MDS UPDRS III  Gait Unable to rise from chair without using arms  Unable to ambulate      MDS UPDRS III                              4/18/19 9/12/19   Time since last dose:  1 5-2 h deu for dose   Speech   2 2   Facial Expression   2 2   Rigidity - Neck   2 1   Rigidity - Upper Extremity (Right)   2 1   Rigidity - Upper Extremity (Left)    0 1   Rigidity - Lower Extremity (Right)   0 2   Rigidity - Lower Extremity (Left)    0 2   Finger Taps (Right)    2 2   Finger Taps (Left)    2 1   Hand Movement (Right)   0 2   Hand Movement (Left)    0 1   Pronation/Supination (Right)   2 2   Pronation/Supination (Left)    1 1   Toe Tapping (Right)  1 3   Toe Tapping (Left)  1 2   Leg Agility (Right)   2 2   Leg Agility (Left)    2 2   Arising from Chair     4   Gait     4   Freezing of Gait      Postural Stability        Posture  1 1   Global spontaneity of movement  2 2   Postural Tremor (Right)  0 0   Postural Tremor (Left)  0 0   Kinetic Tremor (Right)   0 0   Kinetic Tremor (Left)   0 0   Rest tremor amplitude RUE  1 0   Rest tremor amplitude LUE  0 0   Rest tremor amplitude RLE  1 0   Reset tremor amplitude LLE  0 0   Lip/Jaw Tremor   0 0          Motor Exam Total:            ROS:    Review of Systems   Constitutional: Positive for fatigue  Negative for appetite change and fever  HENT: Positive for voice change  Negative for hearing loss, tinnitus and trouble swallowing  Eyes: Negative  Negative for photophobia and pain  Eyes tend to close sometimes today is a good day     Respiratory: Negative  Negative for shortness of breath  Cardiovascular: Negative  Negative for palpitations  Gastrointestinal: Negative  Negative for nausea and vomiting  Endocrine: Negative  Negative for cold intolerance and heat intolerance  Genitourinary: Negative  Negative for dysuria, frequency and urgency  Musculoskeletal: Positive for back pain (once in a while lower back) and gait problem  Negative for myalgias and neck pain  Skin: Negative  Negative for rash  Sores on head     Allergic/Immunologic: Negative  Neurological: Positive for speech difficulty (sometimes forgets words) and weakness  Negative for dizziness, tremors, seizures, syncope, facial asymmetry, light-headedness, numbness and headaches  Hematological: Negative  Does not bruise/bleed easily  Psychiatric/Behavioral: Negative  Negative for confusion, hallucinations and sleep disturbance  All other systems reviewed and are negative    Review of system was personally reviewed

## 2019-12-17 ENCOUNTER — TELEPHONE (OUTPATIENT)
Dept: NEUROLOGY | Facility: CLINIC | Age: 79
End: 2019-12-17

## 2019-12-17 NOTE — TELEPHONE ENCOUNTER
Patient has been having increased tremors and confusion  She had two falls over the weekend and physician at [facility] home is asking if there is any medication changes you are advising at this time      Gabapentin 300mg qd  Clonazepam 0 5mg TID  cardbidopa-levodopa  1 5 tabs 5x a day    Please advise    CB# [phone]

## 2019-12-19 NOTE — TELEPHONE ENCOUNTER
We can try increasing Sinemet to 2 tabs 4 times daily and 1 5 tabs at her last dose  We would have to pay attention as to whether she does not develop increased involuntary movements/ dyskinesia or hallucinations  If so they are to immediately return to prior dosing

## 2019-12-19 NOTE — TELEPHONE ENCOUNTER
Brooke Fuller calling back asking for documentation of dose change  Faxed this encounter to [phone]

## 2019-12-19 NOTE — TELEPHONE ENCOUNTER
Called [facility] and spoke to Brian  Made him aware of below  Verbalized understanding   Nothing else needed form our office at this time

## 2020-02-12 ENCOUNTER — TELEPHONE (OUTPATIENT)
Dept: NEUROLOGY | Facility: CLINIC | Age: 80
End: 2020-02-12

## 2020-02-12 NOTE — TELEPHONE ENCOUNTER
Alisa Nissen Records rev'd from [facility] sleep center.  Records given to Dara Lyon to review and then will be sent to scanning.    DOMINGO Ellis

## 2020-02-13 ENCOUNTER — OFFICE VISIT (OUTPATIENT)
Dept: NEUROLOGY | Facility: CLINIC | Age: 80
End: 2020-02-13
Payer: COMMERCIAL

## 2020-02-13 VITALS
DIASTOLIC BLOOD PRESSURE: 62 MMHG | BODY MASS INDEX: 26.2 KG/M2 | HEART RATE: 80 BPM | HEIGHT: 62 IN | SYSTOLIC BLOOD PRESSURE: 118 MMHG

## 2020-02-13 DIAGNOSIS — G20 PARKINSON'S DISEASE WITH USE OF ELECTRICAL BRAIN STIMULATION (HCC): Primary | ICD-10-CM

## 2020-02-13 DIAGNOSIS — Z96.89 S/P DEEP BRAIN STIMULATOR PLACEMENT: ICD-10-CM

## 2020-02-13 DIAGNOSIS — R13.10 DYSPHAGIA, UNSPECIFIED TYPE: ICD-10-CM

## 2020-02-13 PROCEDURE — 95983 ALYS BRN NPGT PRGRMG 15 MIN: CPT | Performed by: PSYCHIATRY & NEUROLOGY

## 2020-02-13 PROCEDURE — 99213 OFFICE O/P EST LOW 20 MIN: CPT | Performed by: PSYCHIATRY & NEUROLOGY

## 2020-02-13 RX ORDER — FENOPROFEN CALCIUM 200 MG
1 CAPSULE ORAL 2 TIMES DAILY
COMMUNITY

## 2020-02-13 NOTE — PATIENT INSTRUCTIONS
Parkinson with moderate bradykinesia  Medications lasting about 3 hours  Need to be given on time for full effect  Will continue on current medication and stimulation parameters with no changes  Agree with speech therapy consult for dysphagia  Call if new issues arise

## 2020-02-13 NOTE — ASSESSMENT & PLAN NOTE
Parkinson with moderate bradykinesia  Medications lasting about 3 hours  Deep brain stimulator interrogated  Time spent attempting to increase of switch contact on the right for better impedence and to see if this would improve bradykinesia  She was unable to tolerate any change as this caused dizziness which persisted  Returned back to prior settings  It is evident that sinemet need to be given on time for full effect  Will continue on current medication with no changes  See attached clinical sheets for details

## 2020-02-13 NOTE — PROGRESS NOTES
Patient ID: Trenton Craig is a 78 y o  female  Assessment/Plan:    Parkinson's disease with use of electrical brain stimulation ([address] )  Parkinson with moderate bradykinesia  Medications lasting about 3 hours  Deep brain stimulator interrogated  Time spent attempting to increase of switch contact on the right for better impedence and to see if this would improve bradykinesia  She was unable to tolerate any change as this caused dizziness which persisted  Returned back to prior settings  It is evident that sinemet need to be given on time for full effect  Will continue on current medication with no changes  See attached clinical sheets for details  Dysphagia  Agree with speech therapy consult for dysphagia  Call if new issues arise  Diagnoses and all orders for this visit:    Parkinson's disease with use of electrical brain stimulation ([address] )    S/P deep brain stimulator placement    Dysphagia, unspecified type    Other orders  -     hydrocortisone 1 % lotion; Apply 1 application topically 2 (two) times a day           Subjective:    Ms Khang Bautista is a [facility] resident with Parkinson's disease for over 10 years s/p bilateral STN DBS Left chest Activa in May 2012 at [facility], IPG replacements 2/4/16, 3/15/19, who presents for follow up  To review, symptoms began around 2005 with leg shaking  Initially diagnosed with RLS  A few months later, she was diagnosed with PD  When initially seen she was on Sinemet 25/100 2 tabs qid (6am then q 4 hours while awake, 4 times daily) and Comtan every other day  Comtan later stopped as was not covered  [facility] called Dec 17th with report she was having increased tremors and falls  We increased sinemet to 25/100 2 tabs qid and 1 5 tabs qhs  This may have helped with tremors  She still falls on transfering at times  She no longer walks  Speech is soft  She is assisted showering  She is able to dress independently but slowly with occasional assistance   She has dysphagia on soft diet  Yesterday she was choking  She was better after water  They will be having speech come evaluate her today  She sleeps well  She spends most of the day scooting herself in the wheelchair  She transfers to the  Toilet with supervision now given recent falls  She does attend activities  She does go to the dining room for lunch  She is on clonazepam 0 5mg 3 times for anxiety  Objective:    Blood pressure 118/62, pulse 80, height 5' 2" (1 575 m)  Physical Exam   Constitutional: She appears well-developed  Eyes: Pupils are equal, round, and reactive to light  Neurological: She is alert  She has normal strength  Vitals reviewed  Neurological Exam  Mental Status  Alert  Oriented only to person, place and situation  Speech: hypophonia  Language is fluent with no aphasia  Attention and concentration are normal     Cranial Nerves  CN III, IV, VI: Extraocular movements intact bilaterally  Pupils equal round and reactive to light bilaterally  CN V: Facial sensation is normal   CN VII: Full and symmetric facial movement  CN VIII: Hearing is normal   CN IX, X: Palate elevates symmetrically  CN XI: Shoulder shrug strength is normal   CN XII: Tongue midline without atrophy or fasciculations  Motor   Normal muscle tone  Strength is 5/5 throughout all four extremities  Sensory  Light touch is normal in upper and lower extremities  Coordination  Right: Finger-to-nose normal  Rapid alternating movement abnormality:  Left: Finger-to-nose normal  Rapid alternating movement abnormality:  See MDS UPDRS III  Gait  Casual gait: Unable to rise from chair without using arms  Not ambulated      MDS UPDRS III                              9/12/19 2/13/20   Time since last dose: due for dose 2 hours   Speech  2 2   Facial Expression  2 2   Rigidity - Neck  1 1   Rigidity - Upper Extremity (Right)  1 0   Rigidity - Upper Extremity (Left)   1 0   Rigidity - Lower Extremity (Right)  2 1   Rigidity - Lower Extremity (Left)   2 1   Finger Taps (Right)   2 2   Finger Taps (Left)   1 1   Hand Movement (Right)  2 2   Hand Movement (Left)   1 1   Pronation/Supination (Right)  2 3   Pronation/Supination (Left)  ][ 1 1   Toe Tapping (Right) 3 3   Toe Tapping (Left) 2 3   Leg Agility (Right)  2 2   Leg Agility (Left)   2 2   Arising from Chair   4 4   Gait   4 4   Freezing of Gait      Postural Stability        Posture 1 1   Global spontaneity of movement 2 2   Postural Tremor (Right) 0 0   Postural Tremor (Left) 0 0   Kinetic Tremor (Right)  0 0   Kinetic Tremor (Left)  0 0   Rest tremor amplitude RUE 0 0   Rest tremor amplitude LUE 0 0   Rest tremor amplitude RLE 0 0   Reset tremor amplitude LLE 0 0   Lip/Jaw Tremor  0 0          Motor Exam Total:             ROS:    Review of Systems   Constitutional: Positive for fatigue  Negative for appetite change and fever  HENT: Positive for trouble swallowing (yesterday had something caught in her throat - has speech therapy coming in next week) and voice change (Lighter voice - Trouble projecting and it has gotten a little worse)  Negative for hearing loss and tinnitus  Eyes: Negative  Negative for photophobia and pain  Respiratory: Negative  Negative for shortness of breath  Cardiovascular: Negative  Negative for palpitations  Gastrointestinal: Negative  Negative for nausea and vomiting  Endocrine: Negative  Negative for cold intolerance and heat intolerance  Genitourinary: Negative  Negative for dysuria, frequency and urgency  Musculoskeletal: Positive for back pain (Sometimes lower back) and gait problem (Doesnt really walk - only stands to pivot)  Negative for myalgias and neck pain  Balance issues  Falls  Trouble with transferring  Legs feel tired     Skin: Negative  Negative for rash  Scabs on back of neck / head     Allergic/Immunologic: Negative      Neurological: Positive for tremors (breakthrough tremor - not all the time), weakness (Legs) and light-headedness (sometimes - usually happens when she doesnt drink enough fluids)  Negative for dizziness, seizures, syncope, facial asymmetry, speech difficulty, numbness and headaches  Hematological: Negative  Does not bruise/bleed easily  Psychiatric/Behavioral: Negative  Negative for confusion, hallucinations and sleep disturbance  All other systems reviewed and are negative  Review of system was personally reviewed

## 2020-04-06 ENCOUNTER — TELEPHONE (OUTPATIENT)
Dept: NEUROLOGY | Facility: CLINIC | Age: 80
End: 2020-04-06

## 2020-04-14 ENCOUNTER — TELEMEDICINE (OUTPATIENT)
Dept: NEUROLOGY | Facility: CLINIC | Age: 80
End: 2020-04-14
Payer: COMMERCIAL

## 2020-04-14 DIAGNOSIS — G20 DEMENTIA DUE TO PARKINSON'S DISEASE WITH BEHAVIORAL DISTURBANCE (HCC): ICD-10-CM

## 2020-04-14 DIAGNOSIS — F02.81 DEMENTIA DUE TO PARKINSON'S DISEASE WITH BEHAVIORAL DISTURBANCE (HCC): ICD-10-CM

## 2020-04-14 DIAGNOSIS — G20 PARKINSON'S DISEASE WITH USE OF ELECTRICAL BRAIN STIMULATION (HCC): Primary | ICD-10-CM

## 2020-04-14 PROCEDURE — 99213 OFFICE O/P EST LOW 20 MIN: CPT | Performed by: PHYSICIAN ASSISTANT

## 2020-04-14 RX ORDER — CARBIDOPA, LEVODOPA AND ENTACAPONE 37.5; 200; 15 MG/1; MG/1; MG/1
1 TABLET, FILM COATED ORAL 3 TIMES DAILY
COMMUNITY
End: 2020-04-14

## 2020-04-14 RX ORDER — CARBIDOPA AND LEVODOPA 50; 200 MG/1; MG/1
1 TABLET, EXTENDED RELEASE ORAL 2 TIMES DAILY
COMMUNITY
End: 2020-04-14

## 2020-04-28 ENCOUNTER — TELEMEDICINE (OUTPATIENT)
Dept: NEUROLOGY | Facility: CLINIC | Age: 80
End: 2020-04-28
Payer: COMMERCIAL

## 2020-04-28 ENCOUNTER — TELEPHONE (OUTPATIENT)
Dept: NEUROLOGY | Facility: CLINIC | Age: 80
End: 2020-04-28

## 2020-04-28 DIAGNOSIS — G20 PARKINSON'S DISEASE WITH USE OF ELECTRICAL BRAIN STIMULATION (HCC): Primary | ICD-10-CM

## 2020-04-28 DIAGNOSIS — G20 DEMENTIA DUE TO PARKINSON'S DISEASE WITH BEHAVIORAL DISTURBANCE (HCC): ICD-10-CM

## 2020-04-28 DIAGNOSIS — F02.81 DEMENTIA DUE TO PARKINSON'S DISEASE WITH BEHAVIORAL DISTURBANCE (HCC): ICD-10-CM

## 2020-04-28 PROCEDURE — 99214 OFFICE O/P EST MOD 30 MIN: CPT | Performed by: PHYSICIAN ASSISTANT

## 2020-05-07 ENCOUNTER — TELEPHONE (OUTPATIENT)
Dept: NEUROLOGY | Facility: CLINIC | Age: 80
End: 2020-05-07

## 2020-06-22 ENCOUNTER — TELEPHONE (OUTPATIENT)
Dept: NEUROLOGY | Facility: CLINIC | Age: 80
End: 2020-06-22

## 2020-07-09 ENCOUNTER — TELEMEDICINE (OUTPATIENT)
Dept: NEUROLOGY | Facility: CLINIC | Age: 80
End: 2020-07-09
Payer: COMMERCIAL

## 2020-07-09 DIAGNOSIS — R44.1 HALLUCINATIONS, VISUAL: ICD-10-CM

## 2020-07-09 DIAGNOSIS — Z96.89 S/P DEEP BRAIN STIMULATOR PLACEMENT: ICD-10-CM

## 2020-07-09 DIAGNOSIS — G20 PARKINSON'S DISEASE WITH USE OF ELECTRICAL BRAIN STIMULATION (HCC): Primary | ICD-10-CM

## 2020-07-09 DIAGNOSIS — G20 DEMENTIA DUE TO PARKINSON'S DISEASE WITH BEHAVIORAL DISTURBANCE (HCC): ICD-10-CM

## 2020-07-09 DIAGNOSIS — F02.81 DEMENTIA DUE TO PARKINSON'S DISEASE WITH BEHAVIORAL DISTURBANCE (HCC): ICD-10-CM

## 2020-07-09 PROCEDURE — 99214 OFFICE O/P EST MOD 30 MIN: CPT | Performed by: PSYCHIATRY & NEUROLOGY

## 2020-07-09 RX ORDER — LEVOCETIRIZINE DIHYDROCHLORIDE 5 MG/1
TABLET, FILM COATED ORAL
COMMUNITY
Start: 2020-05-12

## 2020-07-09 NOTE — PROGRESS NOTES
Virtual Brief Visit    Assessment/Plan:    Problem List Items Addressed This Visit        Nervous and Auditory    Park[facility]'s disease with use [facility] ([facility] ) - Primary     PD symptoms have progressed over the past year  Increases in Sinemet associated with increase hallucinations  They appear to have decreased since levodopa reduced and mirtazepine discontinued  Dementia has progressed and in speaking with Ms Goldman  It does appear that she has some confusion or delusions along with occasional hallucinations  She also is not easily redirected at times  Hallucinations no longer appear to be interfering with sleep  Time spent reviewing chart  She was on rivastgimine a number of years ago  If hallucination are constant or threatening we may wish to consider starting quetiapine or Nuplazid to improve quality of life  Dementia due to Parkinson's disease with behavioral disturbance ([facility] )       Other    S/P deep brain stimulator placement    Hallucinations, visual                Reason for visit is   Chief Complaint   Patient presents with   [address] [facility] [address] Visit        Encounter provider Jannette Barrow MD    Provider located at [address] [place] [id number]    Recent Visits  No visits were found meeting these conditions  Showing recent visits within past 7 days and meeting all other requirements     Today's Visits  Date Type Provider Dept   07/09/20 Tushar 32 MD [facility]   Showing today's visits and meeting all other requirements     Future Appointments  No visits were found meeting these conditions  Showing future appointments within next 150 days and meeting all other requirements        After connecting through telephone, the patient was identified by name and date of birth   Avani Cerna was informed that this is a telemedicine visit and that the visit is being conducted through telephone  My office door was closed  No one else was in the room  She acknowledged consent and understanding of privacy and security of the platform  The patient has agreed to participate and understands she can discontinue the visit at any time  Patient is aware this is a billable service  It was my intent to perform this visit via video technology but the patient was not able to do a video connection so the visit was completed via audio telephone only  Subjective    Elia Anguiano is a [de-identified] y o  female   89 [facility] resident with Parkinson's disease s/p bilateral STN DBS Left chest Activa in May 2012 at [place], IPG replacements 2/4/16, 3/15/19, who presents for follow up  To review, symptoms began around 2005 with leg shaking  Initially diagnosed with RLS  A few months later, she was diagnosed with PD  When initially seen she was on Sinemet 25/100 2 tabs qid (6am then q 4 hours while awake, 4 times daily) and Comtan every other day  Comtan later stopped as was not covered  Review of Alondra Silva PA-C last notes reveal: [facility] called 4/6 with concerns for worsening confusion and occasional hallucinations  Virtual visit 4/14 [facility]  She was having hallucinations and peak dose dyskinesias so her Sinemet dose was reduced to 1 5tabs 5 times a day  She was doing fine on this dose so no changes made when last seen 4/28/20  Current medications:  Sinemet 25/100mg 1 5tabs 5 times a day (6am, 9am, noon, 3pm, 6pm)  Gabapentin 300mg qd  Clonazepam 0 5mg tid for anxiety     Spoke with nurse Michael Splinter  Throw drinks on ground for no reason  No redirecting her  When worked up she will get twisting of her jar and turning of her neck  Otherwise dyskinesia is not fluctuating with doses of medications as far as they can tell  She can follow directions  She will sometimes try to get up and walk which has led to falls  She self propels in wheelchair  Speech is soft    She is assisted showering and dressing  She has dysphagia on mechincal soft diet with adaptive equipment which his nurse does not feel works for her  She sleeps ok but this is variable  She will say she tired but then back up in a few minutes  She gets restless staying still and in her room  She is on clonazepam 0 5mg 3 times for anxiety  She had some hallucinations at times as she sometimes she is talking to someone who is not there  Described to be picking things out of the air  Spoke with Rainer Bangura  She did not express physical concerns  She did recall I was the physician that manages her surgical device  She was confused about recent events  Sh states her daughter is never coming home              Past Medical History:   Diagnosis Date    Anxiety     Depression     Diabetes mellitus (HCC)     Disease of thyroid gland     GERD (gastroesophageal reflux disease)     Hypertension     Parkinson's disease ([facility] )     S/P deep brain stimulator placement        Past Surgical History:   Procedure Laterality Date    BREAST SURGERY Right     lumpectomy    CATARACT EXTRACTION      EYE SURGERY      HYSTERECTOMY      ND IMP STIM,CRANIAL,SUBQ,1 ARRAY Left 2/4/2016    Procedure: REPLACEMENT OF LEFT CHEST DBS IPG;  Surgeon: Joselin Boyle MD;  [facility];  Service: Neurosurgery    ND IMP STIM,CRANIAL,SUBQ,1 ARRAY Left 3/15/2019    Procedure: REPLACEMENT IMPLANTABLE PULSE GENERATOR (IPG) DEEP BRAIN STIMULATION (DBS), LEFT;  Surgeon: Ira Mcguire MD;  [facility];  Service: Neurosurgery    TOTAL THYROIDECTOMY         Current Outpatient Medications   Medication Sig Dispense Refill    acetaminophen (TYLENOL) 500 mg tablet Take 350 mg by mouth every 4 (four) hours as needed for mild pain Give 650 mg every 4 hours for fever      bisacodyl (DULCOLAX) 5 mg EC tablet Take 10 mg by mouth daily as needed for constipation      carbidopa-levodopa (SINEMET)  mg per tablet Take 1 5tabs at 6-9-12-3 and 6pm 300 tablet 1    cholecalciferol (VITAMIN D3) 1,000 units tablet Take 2,000 Units by mouth   Cholecalciferol 1000 units CHEW Chew 2,000 Units       clonazePAM (KlonoPIN) 0 5 mg tablet 3 (three) times a day       fluticasone (FLONASE) 50 mcg/act nasal spray 1 spray into each nostril daily       gabapentin (NEURONTIN) 300 mg capsule daily       glimepiride (AMARYL) 1 mg tablet Take 1 mg by mouth every morning before breakfast      glucose blood (ACCU-CHEK COMPACT PLUS) test strip 1 each by Other route as needed Use to test blood sugars twice daily      ketoconazole (NIZORAL) 2 % shampoo Apply 1 application topically      levothyroxine 50 mcg tablet Take 1 tablet by mouth daily      Menthol, Topical Analgesic, (BIOFREEZE) 4 % GEL Apply topically      omeprazole (PRILOSEC) 20 mg delayed release capsule Take 20 mg by mouth      polyethylene glycol (MIRALAX) 17 g packet Take 17 g by mouth daily      senna-docusate sodium (SENOKOT S) 8 6-50 mg per tablet Take 1 tablet by mouth daily as needed for constipation   sertraline (ZOLOFT) 100 mg tablet Take 100 mg by mouth daily at bedtime   Cetirizine HCl 5 MG/5ML SYRP Take 5 mg by mouth      hydrocortisone 1 % lotion Apply 1 application topically 2 (two) times a day      hypromellose (NATURES TEARS) 0 4 % SOLN Apply to eye      insulin regular (NOVOLIN R) 100 units/mL injection Inject 5 Units under the skin 2 (two) times a day as needed As needed two times daily       levocetirizine (XYZAL) 5 MG tablet       meclizine (ANTIVERT) 12 5 MG tablet Take 12 5 mg by mouth Three times daily as needed      mineral oil enema Insert 1 enema into the rectum once      polyvinyl alcohol (LIQUIFILM TEARS) 1 4 % ophthalmic solution 1 drop as needed for dry eyes       No current facility-administered medications for this visit  Allergies   Allergen Reactions    Codeine      Other reaction(s):  Other (See Comments)  "dry Heaves"  Severe confusion    Morphine And Related      Severe confusion    Sulfa Antibiotics      Other reaction(s): Other (See Comments)  Respiratory problems    Topiramate      Other reaction(s): Confusion - together with Darvocet    Latex Rash     Other reaction(s): Other (See Comments)  rash/itching       Review of Systems   Constitutional: Negative  Negative for appetite change and fever  HENT: Negative  Negative for hearing loss, tinnitus, trouble swallowing and voice change  Eyes: Negative  Negative for photophobia and pain  Respiratory: Negative  Negative for shortness of breath  Cardiovascular: Negative  Negative for palpitations  Gastrointestinal: Negative  Negative for nausea and vomiting  Endocrine: Negative  Negative for cold intolerance  Genitourinary: Negative  Negative for dysuria, frequency and urgency  Musculoskeletal: Positive for back pain and gait problem (shuffles feet a lot)  Negative for neck pain  Balance issues  Multiple Falls   Skin: Negative  Negative for rash  Allergic/Immunologic: Negative  Neurological: Positive for dizziness, speech difficulty and weakness  Negative for tremors, seizures, syncope, facial asymmetry, light-headedness, numbness and headaches  Hematological: Negative  Does not bruise/bleed easily  Psychiatric/Behavioral: Positive for confusion, hallucinations (stated she has talked to people before when nobody is there) and sleep disturbance  All other systems reviewed and are negative  Review of system was personally reviewed  There were no vitals filed for this visit  I spent 20 minutes with patient today in which greater than 50% of the time was spent in counseling/coordination of care regarding recent history, impression, potential plan  Time included speaking with patient nurse  VIRTUAL VISIT DISCLAIMER    Michelle ALESSIA Mayurtanvi acknowledges that she has consented to an online visit or consultation   She understands that the online visit is based solely on information provided by her, and that, in the absence of a face-to-face physical evaluation by the physician, the diagnosis she receives is both limited and provisional in terms of accuracy and completeness  This is not intended to replace a full medical face-to-face evaluation by the physician  Armando Alcaraz understands and accepts these terms

## 2020-07-09 NOTE — ASSESSMENT & PLAN NOTE
PD symptoms have progressed over the past year  Increases in Sinemet associated with increase hallucinations  They appear to have decreased since levodopa reduced and mirtazepine discontinued  Dementia has progressed and in speaking with Ms Goldman  It does appear that she has some confusion or delusions along with occasional hallucinations  She also is not easily redirected at times  Hallucinations no longer appear to be interfering with sleep  Time spent reviewing chart  She was on rivastgimine a number of years ago  If hallucination are constant or threatening we may wish to consider starting quetiapine or Nuplazid to improve quality of life

## 2020-09-17 ENCOUNTER — TELEPHONE (OUTPATIENT)
Dept: NEUROLOGY | Facility: CLINIC | Age: 80
End: 2020-09-17

## 2020-09-17 ENCOUNTER — TELEMEDICINE (OUTPATIENT)
Dept: NEUROLOGY | Facility: CLINIC | Age: 80
End: 2020-09-17
Payer: COMMERCIAL

## 2020-09-17 DIAGNOSIS — G20 DEMENTIA DUE TO PARKINSON'S DISEASE WITH BEHAVIORAL DISTURBANCE (HCC): ICD-10-CM

## 2020-09-17 DIAGNOSIS — G20 PARKINSON'S DISEASE WITH USE OF ELECTRICAL BRAIN STIMULATION (HCC): Primary | ICD-10-CM

## 2020-09-17 DIAGNOSIS — F02.81 DEMENTIA DUE TO PARKINSON'S DISEASE WITH BEHAVIORAL DISTURBANCE (HCC): ICD-10-CM

## 2020-09-17 PROCEDURE — 99214 OFFICE O/P EST MOD 30 MIN: CPT | Performed by: PHYSICIAN ASSISTANT

## 2020-09-17 NOTE — PROGRESS NOTES
Virtual Brief Visit    Assessment/Plan:    Problem List Items Addressed This Visit        Nervous and Auditory    Parkinson's disease with use of electrical brain stimulation ([facility] ) - Primary     Patient is overall doing well at this time  Spoke with the nurse and she had no questions  She has not had any hallucinations in the past month  She is functioning well in still able to help assist with her ADLs however she does require assistance from the nurses  She is sleeping well  She has lost some weight however the recently started some supplemental shakes  No issues with swallowing  Will not make any changes to her Sinemet dose at this time given in the past this did cause increased hallucinations  Would certainly benefit from seeing her in person to do an exam to make sure that her parkinsonian symptoms are well controlled  No real complaints of tremor  Dementia due to Parkinson's disease with behavioral disturbance [facility])                Reason for visit is follow up for PD  Chief Complaint   Patient presents with    Virtual Brief Visit        Encounter provider Eddie Valdez PA-C    Provider located at [address] [place] [address] [address]  [facility] [address]  [address] [address] [phone]    Recent Visits  No visits were found meeting these conditions  Showing recent visits within past 7 days and meeting all other requirements     Today's Visits  Date Type Provider Dept   09/17/20 Telemedicine Eddie Valdez PA-C  Neuro [facility]   Showing today's visits and meeting all other requirements     Future Appointments  No visits were found meeting these conditions  Showing future appointments within next 150 days and meeting all other requirements        After connecting through the telephone and patient was informed that this is not a secure, HIPAA-complaint platform  She agrees to proceed  , the patient was identified by name and date of birth  Avani Cerna was informed that this is a telemedicine visit and that the visit is being conducted through the telephone and patient was informed that this is not a secure, HIPAA-complaint platform  She agrees to proceed     My office door was closed  No one else was in the room  She acknowledged consent and understanding of privacy and security of the platform  The patient has agreed to participate and understands she can discontinue the visit at any time  Patient is aware this is a billable service  Subjective    Avani Cerna is a [de-identified] y o  female 89 [facility] resident with Parkinson's disease s/p bilateral STN DBS Left chest Activa in May 2012 at [place], [facility] replacements 2/4/16, 3/15/19, who presents for follow up  To review, symptoms began around 2005 with leg shaking  Initially diagnosed with RLS  A few months later, she was diagnosed with PD  When initially seen she was on Sinemet 25/100 2 tabs qid (6am then q 4 hours while awake, 4 times daily) and Comtan every other day  Comtan later stopped as was not covered  Virtual visit 4/14 [facility] was 14/26  At her last visit her PD symptoms had progressed and unfortunately she had increased hallucinations when she had been on higher Sinemet doses  She was was noted to have more confusion and delusions along with occasional hallucinations  No changes were made to her medications  Interval history:  Spoke with Alma Rosa Alarcon, her nurse  She is overall doing well  She has not been having any hallucinations over the past month  She will try and stand on her own and has had some falls  Her memory is overall stable  She requires assistance for her ADLs  She is sleeping well at this time  No aggression or agitation  She will occasionally have some tremors however this is not consistent  She uses adaptive equipment for eating  She has been losing some weight and she is now on a supplement shake  No issues with swallowing     She does some exercise classes during the week  Spoke with the patient and she states that she is doing good  She is eating and sleeping well  She has no new complaints  Current medications:  Sinemet 25/100mg 1 5tabs 5 times a day (6am, 9am, noon, 3pm, 6pm)  Gabapentin 300mg qd  Clonazepam 0 5mg tid for anxiety  Zoloft 100mg     I personally reviewed and updated the ROS  Past Medical History:   Diagnosis Date    Anxiety     Depression     Diabetes mellitus (HCC)     Disease of thyroid gland     GERD (gastroesophageal reflux disease)     Hypertension     [facility]'s disease ([facility] )     S/P deep brain stimulator placement        Past Surgical History:   Procedure Laterality Date    BREAST SURGERY Right     lumpectomy    CATARACT EXTRACTION      EYE SURGERY      HYSTERECTOMY      MS IMP STIM,CRANIAL,SUBQ,1 ARRAY Left 2/4/2016    Procedure: REPLACEMENT OF LEFT CHEST DBS IPG;  Surgeon: Yeni Miller MD;  [facility];  Service: Neurosurgery    MS IMP STIM,CRANIAL,SUBQ,1 ARRAY Left 3/15/2019    Procedure: REPLACEMENT IMPLANTABLE PULSE GENERATOR (IPG) DEEP BRAIN STIMULATION (DBS), LEFT;  Surgeon: Felisa Weeks MD;  [facility];  Service: Neurosurgery    TOTAL THYROIDECTOMY         Current Outpatient Medications   Medication Sig Dispense Refill    acetaminophen (TYLENOL) 500 mg tablet Take 350 mg by mouth every 4 (four) hours as needed for mild pain Give 650 mg every 4 hours for fever      carbidopa-levodopa (SINEMET)  mg per tablet Take 1 5tabs at 6-9-12-3 and 6pm 300 tablet 1    cholecalciferol (VITAMIN D3) 1,000 units tablet Take 2,000 Units by mouth        Cholecalciferol 1000 units CHEW Chew 2,000 Units       clonazePAM (KlonoPIN) 0 5 mg tablet 3 (three) times a day       fluticasone (FLONASE) 50 mcg/act nasal spray 1 spray into each nostril daily       gabapentin (NEURONTIN) 300 mg capsule daily       glimepiride (AMARYL) 1 mg tablet Take 1 mg by mouth every morning before breakfast  glucose blood (ACCU-CHEK COMPACT PLUS) test strip 1 each by Other route as needed Use to test blood sugars twice daily      hypromellose (NATURES TEARS) 0 4 % SOLN Apply to eye      ketoconazole (NIZORAL) 2 % shampoo Apply 1 application topically      levocetirizine (XYZAL) 5 MG tablet       levothyroxine 50 mcg tablet Take 1 tablet by mouth daily      omeprazole (PRILOSEC) 20 mg delayed release capsule Take 20 mg by mouth      senna-docusate sodium (SENOKOT S) 8 6-50 mg per tablet Take 1 tablet by mouth daily as needed for constipation   sertraline (ZOLOFT) 100 mg tablet Take 100 mg by mouth daily at bedtime   bisacodyl (DULCOLAX) 5 mg EC tablet Take 10 mg by mouth daily as needed for constipation      Cetirizine HCl 5 MG/5ML SYRP Take 5 mg by mouth      hydrocortisone 1 % lotion Apply 1 application topically 2 (two) times a day      insulin regular (NOVOLIN R) 100 units/mL injection Inject 5 Units under the skin 2 (two) times a day as needed As needed two times daily       meclizine (ANTIVERT) 12 5 MG tablet Take 12 5 mg by mouth Three times daily as needed      Menthol, Topical Analgesic, (BIOFREEZE) 4 % GEL Apply topically      mineral oil enema Insert 1 enema into the rectum once      polyethylene glycol (MIRALAX) 17 g packet Take 17 g by mouth daily      polyvinyl alcohol (LIQUIFILM TEARS) 1 4 % ophthalmic solution 1 drop as needed for dry eyes       No current facility-administered medications for this visit  Allergies   Allergen Reactions    Codeine      Other reaction(s): Other (See Comments)  "dry Heaves"  Severe confusion    Morphine And Related      Severe confusion    Sulfa Antibiotics      Other reaction(s): Other (See Comments)  Respiratory problems    Topiramate      Other reaction(s): Confusion - together with Darvocet    Latex Rash     Other reaction(s): Other (See Comments)  rash/itching       Review of Systems   Constitutional: Negative    Negative for appetite change and fever  HENT: Negative  Negative for hearing loss, tinnitus, trouble swallowing and voice change  Eyes: Negative  Negative for photophobia and pain  Respiratory: Negative  Negative for shortness of breath  Cardiovascular: Negative  Negative for palpitations  Gastrointestinal: Negative  Negative for nausea and vomiting  Endocrine: Negative  Negative for cold intolerance  Genitourinary: Negative  Negative for dysuria, frequency and urgency  Musculoskeletal: Positive for gait problem  Negative for myalgias and neck pain  Fall a couple days ago  Balance issues     Skin: Negative  Negative for rash  Allergic/Immunologic: Negative  Neurological: Negative for dizziness, tremors, seizures, syncope, facial asymmetry, speech difficulty, weakness, light-headedness, numbness and headaches  Hematological: Negative  Does not bruise/bleed easily  Psychiatric/Behavioral: Negative  Negative for confusion, hallucinations and sleep disturbance  All other systems reviewed and are negative  There were no vitals filed for this visit  I spent 15 minutes directly with the patient during this visit    VIRTUAL VISIT [facility] acknowledges that she has consented to an online visit or consultation  She understands that the online visit is based solely on information provided by her, and that, in the absence of a face-to-face physical evaluation by the physician, the diagnosis she receives is both limited and provisional in terms of accuracy and completeness  This is not intended to replace a full medical face-to-face evaluation by the physician  Jett Jeffrey understands and accepts these terms

## 2020-09-17 NOTE — TELEPHONE ENCOUNTER
Nick Sanchez [phone] scheduled patients 4m f/u with Jennifer Maurice  Faxed AVS to [phone]

## 2020-09-17 NOTE — ASSESSMENT & PLAN NOTE
Patient is overall doing well at this time  Spoke with the nurse and she had no questions  She has not had any hallucinations in the past month  She is functioning well in still able to help assist with her ADLs however she does require assistance from the nurses  She is sleeping well  She has lost some weight however the recently started some supplemental shakes  No issues with swallowing  Will not make any changes to her Sinemet dose at this time given in the past this did cause increased hallucinations  Would certainly benefit from seeing her in person to do an exam to make sure that her parkinsonian symptoms are well controlled  No real complaints of tremor

## (undated) DEVICE — BETHLEHEM UNIVERSAL MINOR GEN: Brand: CARDINAL HEALTH

## (undated) DEVICE — 3M™ TEGADERM™ TRANSPARENT FILM DRESSING FRAME STYLE, 1626W, 4 IN X 4-3/4 IN (10 CM X 12 CM), 50/CT 4CT/CASE: Brand: 3M™ TEGADERM™

## (undated) DEVICE — SPONGE PVP SCRUB WING STERILE

## (undated) DEVICE — TELFA NON-ADHERENT ABSORBENT DRESSING: Brand: TELFA

## (undated) DEVICE — TUBING SUCTION 5MM X 12 FT

## (undated) DEVICE — ADHESIVE SKN CLSR HISTOACRYL FLEX 0.5ML LF

## (undated) DEVICE — PENCIL ELECTROSURG E-Z CLEAN -0035H

## (undated) DEVICE — PREP SURGICAL PURPREP 26ML

## (undated) DEVICE — GLOVE INDICATOR PI UNDERGLOVE SZ 7.5 BLUE

## (undated) DEVICE — PROXIMATE PLUS MD MULTI-DIRECTIONAL RELEASE SKIN STAPLERS CONTAINS 35 STAINLESS STEEL STAPLES APPROXIMATE CLOSED DIMENSIONS: 6.9MM X 3.9MM WIDE: Brand: PROXIMATE

## (undated) DEVICE — DRAPE EQUIPMENT RF WAND

## (undated) DEVICE — DRAPE CAMERA/LASER

## (undated) DEVICE — BULB SYRINGE,IRRIGATION WITH PROTECTIVE CAP: Brand: DOVER

## (undated) DEVICE — GLOVE SRG BIOGEL ECLIPSE 7

## (undated) DEVICE — NEEDLE 25G X 1 1/2

## (undated) DEVICE — ELECTRODE BLADE MOD E-Z CLEAN 2.5IN 6.4CM -0012M

## (undated) DEVICE — 3M™ IOBAN™ 2 ANTIMICROBIAL INCISE DRAPE 6640EZ: Brand: IOBAN™ 2

## (undated) DEVICE — INTENDED FOR TISSUE SEPARATION, AND OTHER PROCEDURES THAT REQUIRE A SHARP SURGICAL BLADE TO PUNCTURE OR CUT.: Brand: BARD-PARKER SAFETY BLADES SIZE 10, STERILE

## (undated) DEVICE — SUT SILK 2-0 SH 30 IN K833H

## (undated) DEVICE — REM POLYHESIVE ADULT PATIENT RETURN ELECTRODE: Brand: VALLEYLAB

## (undated) DEVICE — ANTIBACTERIAL VIOLET BRAIDED (POLYGLACTIN 910), SYNTHETIC ABSORBABLE SUTURE: Brand: COATED VICRYL

## (undated) DEVICE — PROGRAMMER NEUROSTIM ACTIVA PC RECHARGEABLE DBS THERAPY

## (undated) DEVICE — LIGHT HANDLE COVER SLEEVE DISP BLUE STELLAR